# Patient Record
Sex: FEMALE | Race: WHITE | NOT HISPANIC OR LATINO | Employment: FULL TIME | URBAN - METROPOLITAN AREA
[De-identification: names, ages, dates, MRNs, and addresses within clinical notes are randomized per-mention and may not be internally consistent; named-entity substitution may affect disease eponyms.]

---
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
## 2018-04-25 ENCOUNTER — OFFICE VISIT (OUTPATIENT)
Dept: URGENT CARE | Facility: PHYSICIAN GROUP | Age: 18
End: 2018-04-25

## 2018-04-25 VITALS
WEIGHT: 207 LBS | HEIGHT: 62 IN | TEMPERATURE: 99.1 F | BODY MASS INDEX: 38.09 KG/M2 | DIASTOLIC BLOOD PRESSURE: 66 MMHG | HEART RATE: 110 BPM | OXYGEN SATURATION: 98 % | SYSTOLIC BLOOD PRESSURE: 112 MMHG

## 2018-04-25 DIAGNOSIS — S61.309A AVULSION OF FINGERNAIL, INITIAL ENCOUNTER: ICD-10-CM

## 2018-04-25 PROCEDURE — 90471 IMMUNIZATION ADMIN: CPT | Performed by: PHYSICIAN ASSISTANT

## 2018-04-25 PROCEDURE — 99214 OFFICE O/P EST MOD 30 MIN: CPT | Mod: 25 | Performed by: PHYSICIAN ASSISTANT

## 2018-04-25 PROCEDURE — 90715 TDAP VACCINE 7 YRS/> IM: CPT | Performed by: PHYSICIAN ASSISTANT

## 2018-04-25 RX ORDER — ONDANSETRON 4 MG/1
4 TABLET, ORALLY DISINTEGRATING ORAL ONCE
Status: COMPLETED | OUTPATIENT
Start: 2018-04-25 | End: 2018-04-25

## 2018-04-25 RX ADMIN — ONDANSETRON 4 MG: 4 TABLET, ORALLY DISINTEGRATING ORAL at 18:31

## 2018-04-25 ASSESSMENT — ENCOUNTER SYMPTOMS
SENSORY CHANGE: 0
FOCAL WEAKNESS: 0
NAUSEA: 1
TINGLING: 0
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
## 2018-04-25 NOTE — PATIENT INSTRUCTIONS
Nail Avulsion Injury  Nail avulsion means that you have lost the whole, or part of a nail. The nail will usually grow back in 2 to 6 months. If your injury damaged the growth center of the nail, the nail may be deformed, split, or not stuck to the nail bed. Sometimes the avulsed nail is stitched back in place. This provides temporary protection to the nail bed until the new nail grows in.   HOME CARE INSTRUCTIONS   · Raise (elevate) your injury as much as possible.  · Protect the injury and cover it with bandages (dressings) or splints as instructed.  · Change dressings as instructed.  SEEK MEDICAL CARE IF:   · There is increasing pain, redness, or swelling.  · You cannot move your fingers or toes.  This information is not intended to replace advice given to you by your health care provider. Make sure you discuss any questions you have with your health care provider.  Document Released: 01/25/2006 Document Revised: 03/11/2013 Document Reviewed: 05/04/2016  [employer] Interactive Patient Education © 2017 [employer]

## 2019-02-13 ENCOUNTER — HOSPITAL ENCOUNTER (OUTPATIENT)
Dept: LAB | Facility: MEDICAL CENTER | Age: 19
End: 2019-02-13
Attending: FAMILY MEDICINE
Payer: COMMERCIAL

## 2019-02-13 ENCOUNTER — OFFICE VISIT (OUTPATIENT)
Dept: MEDICAL GROUP | Facility: PHYSICIAN GROUP | Age: 19
End: 2019-02-13
Payer: COMMERCIAL

## 2019-02-13 ENCOUNTER — HOSPITAL ENCOUNTER (OUTPATIENT)
Facility: MEDICAL CENTER | Age: 19
End: 2019-02-13
Attending: FAMILY MEDICINE
Payer: COMMERCIAL

## 2019-02-13 VITALS
HEIGHT: 62 IN | OXYGEN SATURATION: 99 % | TEMPERATURE: 98.5 F | DIASTOLIC BLOOD PRESSURE: 66 MMHG | HEART RATE: 92 BPM | BODY MASS INDEX: 36.99 KG/M2 | WEIGHT: 201 LBS | SYSTOLIC BLOOD PRESSURE: 118 MMHG

## 2019-02-13 DIAGNOSIS — J02.9 PHARYNGITIS, UNSPECIFIED ETIOLOGY: ICD-10-CM

## 2019-02-13 LAB — QORDR QORDR: NORMAL

## 2019-02-13 PROCEDURE — 86665 EPSTEIN-BARR CAPSID VCA: CPT

## 2019-02-13 PROCEDURE — 99214 OFFICE O/P EST MOD 30 MIN: CPT | Performed by: FAMILY MEDICINE

## 2019-02-13 PROCEDURE — 86664 EPSTEIN-BARR NUCLEAR ANTIGEN: CPT

## 2019-02-13 PROCEDURE — 87070 CULTURE OTHR SPECIMN AEROBIC: CPT

## 2019-02-13 PROCEDURE — 86663 EPSTEIN-BARR ANTIBODY: CPT

## 2019-02-13 PROCEDURE — 36415 COLL VENOUS BLD VENIPUNCTURE: CPT

## 2019-02-13 PROCEDURE — 99000 SPECIMEN HANDLING OFFICE-LAB: CPT | Performed by: FAMILY MEDICINE

## 2019-02-13 ASSESSMENT — PATIENT HEALTH QUESTIONNAIRE - PHQ9: CLINICAL INTERPRETATION OF PHQ2 SCORE: 0

## 2019-02-13 NOTE — PROGRESS NOTES
CC: Pharyngitis    HISTORY OF THE PRESENT ILLNESS: Patient is a 18 y.o. female. This pleasant patient is here today to establish care and discuss the following health issues.    Pharyngitis: Patient notes that around November she was seen at an outside urgent care.  At that time she was diagnosed with viral pharyngitis.  She was apparently only tested for strep at that time and not flu or any other causes for pharyngitis.  Her associated symptoms included fevers, chills, cold symptoms and very significant fatigue.  She also notes a questionable rash on her hands.  Since that time the illness has somewhat improved although patient has remained with a sore throat and some sensation of swollen lymph nodes in her neck.  She also feels that her voice has never recovered and she continues to be hoarse.  She does also report that she has a couple of episodes of strep throat per year.  She like to be evaluated by ear nose and throat for possible tonsillectomy.    Allergies: Patient has no known allergies.    Current Outpatient Prescriptions Ordered in [facility]   Medication Sig Dispense Refill   • IBUPROFEN PO Take  by mouth.       No current Epic-ordered facility-administered medications on file.        Past Medical History:   Diagnosis Date   • Migraines        Past Surgical History:   Procedure Laterality Date   • EYE LACERATION REPAIR  1/25/2009    Performed by LARON MALCOLM at SURGERY [facility]       Social History   Substance Use Topics   • Smoking status: Never Smoker   • Smokeless tobacco: Never Used   • Alcohol use No       Social History     Social History Narrative   • No narrative on file       Family History   Problem Relation Age of Onset   • Heart Disease Maternal Grandfather        ROS:     - Constitutional: Negative for fever, chills, unexpected weight change, and fatigue/generalized weakness.     - HEENT see HPI.      - Respiratory: Negative for cough, sputum production, chest congestion, dyspnea,  "wheezing, and crackles.      - Cardiovascular: Negative for chest pain, palpitations, orthopnea, PND, and bilateral lower extremity edema.     - Gastrointestinal: Negative for heartburn, nausea, vomiting, abdominal pain, hematochezia, melena, diarrhea, constipation, and greasy/foul-smelling stools.     - Genitourinary: Negative for dysuria, polyuria, hematuria, pyuria, urinary urgency, and urinary incontinence.     - Skin: Negative for rash, itching, cyanotic skin color change.     Exam: Blood pressure 118/66, pulse 92, temperature 36.9 °C (98.5 °F), temperature source Temporal, height 1.575 m (5' 2\"), weight 91.2 kg (201 lb), SpO2 99 %. Body mass index is 36.76 kg/m².    General: Well appearing, NAD  HEENT: Normocephalic. Conjunctiva clear, lids without ptosis, pupils equal and reactive to light accommodation, ears normal shape and contour, canals are clear bilaterally, tympanic membranes without bulging or erythema and normal cone of light, nasal mucosa normal, oropharynx is without erythema, edema or exudates.   Pulmonary: Clear to ausculation.  Normal effort. No rales, ronchi, or wheezing.  Cardiovascular: Regular rate and rhythm without murmur, rubs or gallop.   Abdomen: Soft, nontender, nondistended. Normal bowel sounds. Liver and spleen are not palpable. No rebound or guarding  Lymph: No cervical, supraclavicular lymph nodes are palpable  Skin: Warm and dry.  No obvious lesions.  Psych: Normal mood and affect. Alert and oriented. Judgment and insight is normal.    Please note that this dictation was created using voice recognition software. I have made every reasonable attempt to correct obvious errors, but I expect that there are errors of grammar and possibly content that I did not discover before finalizing the note.      Assessment/Plan  Tami was seen today for pharyngitis.    Diagnoses and all orders for this visit:    Pharyngitis, unspecified etiology  New uncontrolled problem for the patient.  " Oropharynx actually normal in appearance today.  We will go ahead and do throat culture, blood test for EBV and referral to ENT.  -     CULTURE THROAT; Future  -     REFERRAL TO ENT  -     EBV CHRONIC/ACTIVE INFECTION; Future    Follow up as needed.     Laurence Lynch, DO  [facility]

## 2019-02-15 LAB
BACTERIA SPEC RESP CULT: NORMAL
EBV EA-D IGG SER-ACNC: <5 U/ML (ref 0–10.9)
EBV NA IGG SER IA-ACNC: 30.1 U/ML (ref 0–21.9)
EBV VCA IGG SER IA-ACNC: 535 U/ML (ref 0–21.9)
SIGNIFICANT IND 70042: NORMAL
SITE SITE: NORMAL
SOURCE SOURCE: NORMAL

## 2019-02-19 DIAGNOSIS — J02.9 PHARYNGITIS, UNSPECIFIED ETIOLOGY: ICD-10-CM

## 2019-05-09 ENCOUNTER — HOSPITAL ENCOUNTER (OUTPATIENT)
Dept: RADIOLOGY | Facility: MEDICAL CENTER | Age: 19
End: 2019-05-09
Attending: SPECIALIST
Payer: COMMERCIAL

## 2019-05-09 DIAGNOSIS — R13.19 ESOPHAGEAL DYSPHAGIA: ICD-10-CM

## 2019-05-09 PROCEDURE — 74220 X-RAY XM ESOPHAGUS 1CNTRST: CPT

## 2019-05-09 PROCEDURE — 700101 HCHG RX REV CODE 250: Performed by: SPECIALIST

## 2019-05-09 RX ADMIN — BARIUM SULFATE 700 MG: 700 TABLET ORAL at 13:15

## 2019-11-20 ENCOUNTER — OFFICE VISIT (OUTPATIENT)
Dept: MEDICAL GROUP | Facility: MEDICAL CENTER | Age: 19
End: 2019-11-20
Payer: COMMERCIAL

## 2019-11-20 VITALS
OXYGEN SATURATION: 96 % | RESPIRATION RATE: 16 BRPM | TEMPERATURE: 98.8 F | DIASTOLIC BLOOD PRESSURE: 72 MMHG | HEART RATE: 99 BPM | HEIGHT: 62 IN | WEIGHT: 208 LBS | BODY MASS INDEX: 38.28 KG/M2 | SYSTOLIC BLOOD PRESSURE: 118 MMHG

## 2019-11-20 DIAGNOSIS — E65 BUFFALO HUMP: ICD-10-CM

## 2019-11-20 DIAGNOSIS — G43.009 MIGRAINE WITHOUT AURA AND WITHOUT STATUS MIGRAINOSUS, NOT INTRACTABLE: ICD-10-CM

## 2019-11-20 DIAGNOSIS — Z76.89 ENCOUNTER TO ESTABLISH CARE: ICD-10-CM

## 2019-11-20 DIAGNOSIS — E66.9 OBESITY (BMI 30-39.9): ICD-10-CM

## 2019-11-20 DIAGNOSIS — R53.83 OTHER FATIGUE: ICD-10-CM

## 2019-11-20 DIAGNOSIS — R63.5 WEIGHT GAIN: ICD-10-CM

## 2019-11-20 DIAGNOSIS — M25.50 ARTHRALGIA, UNSPECIFIED JOINT: ICD-10-CM

## 2019-11-20 PROCEDURE — 99214 OFFICE O/P EST MOD 30 MIN: CPT | Performed by: NURSE PRACTITIONER

## 2019-11-20 NOTE — PROGRESS NOTES
CC: Establish care/weight gain/fatigue      Tami Jimenez is a 19 y.o. female here to establish care and to discuss the evaluation and management of:    Patient here today to establish care.  Here with her grandmother today.    Former PCP: none    Patient presents today to discuss : weight gain    1. Weight gain  Patient presents today to discuss her weight gain and inability to keep her weight off.  States that she was the age of 14 she really started to gain weight.  Apparently gained about 40 pounds in 1 month.  She also started to develop a bump on the back of her neck.  Does not drink sodas-causes her indigestion, avoids dairy because it makes her break out. Drinks mostly water and tea through the day. No exercise. Has tried to loose weight and went to the gym did not make a difference.     2. Other fatigue  Patient did have a positive Raphael-Barr virus back in February.  She complains of continued fatigue. Feels like her tonsils are swollen. They are achy.  No stuffy nose. This has been since that she was sick back last year. No fevers    3. Arthralgia, unspecified joint  Right knee and both wrist-joint pain for sometime. No dry mouth, no dry eye. She reports she did injure her wrists as a child- no bone breaks.    4. [place] hump  States she has had this on the back of her neck since the age of 14.  No pain.    5. Migraine without aura and without status migrainosus, not intractable  Gets them every other day. Started at age 10, takes Excedrin which can be helpful.  Does take with food but makes her stomach hurt.  Sometimes around her period. Some stress can exacerbate these.  Starts at the back of her neck and goes over her head, left side of her head and around her eyes.        ROS:  Denies any Headache, Blurred Vision, Confusion, Chest pain,  Shortness of breath,  Abdominal pain, Changes of bowel or bladder, Hematuria, Hematochezia, Lower ext. edema, Fevers, Nights sweats, Weight Changes, Focal  weakness or numbness.  And all other systems are negative.      Current Outpatient Medications:   •  IBUPROFEN PO, Take  by mouth., Disp: , Rfl:     No Known Allergies    Past Medical History:   Diagnosis Date   • Migraines     no aura     Past Surgical History:   Procedure Laterality Date   • EYE LACERATION REPAIR  1/25/2009    Performed by LARON MALCOLM at SURGERY [facility]S     Family History   Problem Relation Age of Onset   • Heart Disease Maternal Grandfather    • Hypertension Maternal Grandfather    • No Known Problems Mother    • Heart Disease Paternal Grandmother         CABG   • Heart Attack Paternal Grandfather    • Alcohol abuse Paternal Grandfather    • Diabetes Other      Social History     Socioeconomic History   • Marital status: Single     Spouse name: Not on file   • Number of children: Not on file   • Years of education: Not on file   • Highest education level: Not on file   Occupational History   • Not on file   Social Needs   • Financial resource strain: Not on file   • Food insecurity:     Worry: Not on file     Inability: Not on file   • Transportation needs:     Medical: Not on file     Non-medical: Not on file   Tobacco Use   • Smoking status: Never Smoker   • Smokeless tobacco: Never Used   Substance and Sexual Activity   • Alcohol use: Yes     Comment: occ   • Drug use: No   • Sexual activity: Not Currently   Lifestyle   • Physical activity:     Days per week: Not on file     Minutes per session: Not on file   • Stress: Not on file   Relationships   • Social connections:     Talks on phone: Not on file     Gets together: Not on file     Attends [employer] service: Not on file     Active member of club or organization: Not on file     Attends meetings of clubs or organizations: Not on file     Relationship status: Not on file   • Intimate partner violence:     Fear of current or ex partner: Not on file     Emotionally abused: Not on file     Physically abused: Not on file     Forced  "sexual activity: Not on file   Other Topics Concern   • Not on file   Social History Narrative   • Not on file       Objective:     Vitals: /72   Pulse 99   Temp 37.1 °C (98.8 °F)   Resp 16   Ht 1.575 m (5' 2\")   Wt 94.3 kg (208 lb)   SpO2 96%   BMI 38.04 kg/m²      General: Alert, pleasant, NAD  HEENT:  Normocephalic. Neck supple.  No thyromegaly or masses palpated. No cervical or supraclavicular lymphadenopathy.  Heart:  Regular rate and rhythm.  S1 and S2 normal.  No murmurs appreciated.    Respiratory:  Normal respiratory effort.  Clear to auscultation bilaterally.    Skin:  Warm, dry, no rashes  Musculoskeletal:  Gait is normal.  Moves all extremities well. Fat pad at base of neck  Extremities: No leg edema.  Neurological: No tremors,   Psych:  Affect/mood is normal, judgement is good, memory is intact, grooming is appropriate.      Assessment and Plan.     19 y.o. female to establish care and discuss the followin. Weight gain  - CBC WITHOUT DIFFERENTIAL; Future  - Comp Metabolic Panel; Future  - HEMOGLOBIN A1C; Future  - TSH WITH REFLEX TO FT4; Future  - TRIIDOTHYRONINE; Future  - CORTISOL; Future  - PROLACTIN; Future  - ACTH; Future  - PTH INTACT (PTH ONLY); Future    2. Other fatigue  ?  Raphael-Barr virus positive in 2019.  - ACTH; Future  - PTH INTACT (PTH ONLY); Future    3. Arthralgia, unspecified joint  - CHATA REFLEXIVE PROFILE; Future  - RHEUMATOID ARTHRITIS FACTOR; Future    4. [place] hump  - ACTH; Future  - PTH INTACT (PTH ONLY); Future    5. Migraine without aura and without status migrainosus, not intractable  Chronic.  Encouraged patient to utilize Tylenol or ibuprofen for headache. Try to keep a headache diary of triggers such as excessive caffeine, lack of sleep, neck and shoulder muscle tension, increased stress, prolonged computer time or poor lighting.    6. Obesity (BMI 30-39.9)  Chronic. Patient encouraged to reduce excess calorie consumption. Encouraged " regular exercise. Discussed long term sequelae of obesity.  - Patient identified as having weight management issue.  Appropriate orders and counseling given.    7. Encounter to establish care        No follow-ups on file.        Laurence PORRAS.

## 2019-11-21 ENCOUNTER — HOSPITAL ENCOUNTER (OUTPATIENT)
Dept: LAB | Facility: MEDICAL CENTER | Age: 19
End: 2019-11-21
Attending: FAMILY MEDICINE
Payer: COMMERCIAL

## 2019-11-21 ENCOUNTER — HOSPITAL ENCOUNTER (OUTPATIENT)
Dept: LAB | Facility: MEDICAL CENTER | Age: 19
End: 2019-11-21
Attending: NURSE PRACTITIONER
Payer: COMMERCIAL

## 2019-11-21 DIAGNOSIS — J02.9 PHARYNGITIS, UNSPECIFIED ETIOLOGY: ICD-10-CM

## 2019-11-21 DIAGNOSIS — R63.5 WEIGHT GAIN: ICD-10-CM

## 2019-11-21 DIAGNOSIS — M25.50 ARTHRALGIA, UNSPECIFIED JOINT: ICD-10-CM

## 2019-11-21 PROBLEM — G44.209 TENSION HEADACHE: Status: ACTIVE | Noted: 2019-11-21

## 2019-11-21 PROBLEM — G43.009 MIGRAINE WITHOUT AURA AND WITHOUT STATUS MIGRAINOSUS, NOT INTRACTABLE: Status: ACTIVE | Noted: 2019-11-21

## 2019-11-21 PROBLEM — E66.9 OBESITY (BMI 30-39.9): Status: ACTIVE | Noted: 2019-11-21

## 2019-11-21 PROBLEM — E66.9 OBESITY (BMI 30-39.9): Status: RESOLVED | Noted: 2019-11-21 | Resolved: 2019-11-21

## 2019-11-21 LAB
ALBUMIN SERPL BCP-MCNC: 4.2 G/DL (ref 3.2–4.9)
ALBUMIN/GLOB SERPL: 1.3 G/DL
ALP SERPL-CCNC: 95 U/L (ref 30–99)
ALT SERPL-CCNC: 17 U/L (ref 2–50)
ANION GAP SERPL CALC-SCNC: 8 MMOL/L (ref 0–11.9)
AST SERPL-CCNC: 17 U/L (ref 12–45)
BILIRUB SERPL-MCNC: 0.4 MG/DL (ref 0.1–1.5)
BUN SERPL-MCNC: 10 MG/DL (ref 8–22)
CALCIUM SERPL-MCNC: 9 MG/DL (ref 8.5–10.5)
CHLORIDE SERPL-SCNC: 103 MMOL/L (ref 96–112)
CO2 SERPL-SCNC: 25 MMOL/L (ref 20–33)
CORTIS SERPL-MCNC: 11 UG/DL (ref 0–23)
CREAT SERPL-MCNC: 0.75 MG/DL (ref 0.5–1.4)
ERYTHROCYTE [DISTWIDTH] IN BLOOD BY AUTOMATED COUNT: 47.4 FL (ref 35.9–50)
EST. AVERAGE GLUCOSE BLD GHB EST-MCNC: 105 MG/DL
GLOBULIN SER CALC-MCNC: 3.3 G/DL (ref 1.9–3.5)
GLUCOSE SERPL-MCNC: 87 MG/DL (ref 65–99)
HBA1C MFR BLD: 5.3 % (ref 0–5.6)
HCT VFR BLD AUTO: 45 % (ref 37–47)
HGB BLD-MCNC: 14.6 G/DL (ref 12–16)
MCH RBC QN AUTO: 30.7 PG (ref 27–33)
MCHC RBC AUTO-ENTMCNC: 32.4 G/DL (ref 33.6–35)
MCV RBC AUTO: 94.5 FL (ref 81.4–97.8)
PLATELET # BLD AUTO: 225 K/UL (ref 164–446)
PMV BLD AUTO: 11.1 FL (ref 9–12.9)
POTASSIUM SERPL-SCNC: 3.9 MMOL/L (ref 3.6–5.5)
PROLACTIN SERPL-MCNC: 10.46 NG/ML (ref 2.8–26)
PROT SERPL-MCNC: 7.5 G/DL (ref 6–8.2)
RBC # BLD AUTO: 4.76 M/UL (ref 4.2–5.4)
RHEUMATOID FACT SER IA-ACNC: <10 IU/ML (ref 0–14)
SODIUM SERPL-SCNC: 136 MMOL/L (ref 135–145)
T3 SERPL-MCNC: 125.4 NG/DL (ref 60–181)
TSH SERPL DL<=0.005 MIU/L-ACNC: 1.34 UIU/ML (ref 0.38–5.33)
WBC # BLD AUTO: 7.4 K/UL (ref 4.8–10.8)

## 2019-11-21 PROCEDURE — 85027 COMPLETE CBC AUTOMATED: CPT

## 2019-11-21 PROCEDURE — 86038 ANTINUCLEAR ANTIBODIES: CPT

## 2019-11-21 PROCEDURE — 84443 ASSAY THYROID STIM HORMONE: CPT

## 2019-11-21 PROCEDURE — 86431 RHEUMATOID FACTOR QUANT: CPT

## 2019-11-21 PROCEDURE — 82533 TOTAL CORTISOL: CPT

## 2019-11-21 PROCEDURE — 36415 COLL VENOUS BLD VENIPUNCTURE: CPT

## 2019-11-21 PROCEDURE — 83036 HEMOGLOBIN GLYCOSYLATED A1C: CPT

## 2019-11-21 PROCEDURE — 80053 COMPREHEN METABOLIC PANEL: CPT

## 2019-11-21 PROCEDURE — 84480 ASSAY TRIIODOTHYRONINE (T3): CPT

## 2019-11-21 PROCEDURE — 84146 ASSAY OF PROLACTIN: CPT

## 2019-11-21 PROCEDURE — 86665 EPSTEIN-BARR CAPSID VCA: CPT

## 2019-11-22 LAB
EBV VCA IGM SER IA-ACNC: <10 U/ML (ref 0–43.9)
NUCLEAR IGG SER QL IA: NORMAL

## 2019-12-02 ENCOUNTER — OFFICE VISIT (OUTPATIENT)
Dept: MEDICAL GROUP | Facility: MEDICAL CENTER | Age: 19
End: 2019-12-02
Payer: COMMERCIAL

## 2019-12-02 VITALS
WEIGHT: 210 LBS | DIASTOLIC BLOOD PRESSURE: 60 MMHG | SYSTOLIC BLOOD PRESSURE: 108 MMHG | RESPIRATION RATE: 16 BRPM | OXYGEN SATURATION: 98 % | HEART RATE: 99 BPM | BODY MASS INDEX: 38.64 KG/M2 | HEIGHT: 62 IN | TEMPERATURE: 98.2 F

## 2019-12-02 DIAGNOSIS — Z71.3 ENCOUNTER FOR WEIGHT LOSS COUNSELING: ICD-10-CM

## 2019-12-02 DIAGNOSIS — E66.9 OBESITY (BMI 35.0-39.9 WITHOUT COMORBIDITY): ICD-10-CM

## 2019-12-02 DIAGNOSIS — R63.5 WEIGHT GAIN: ICD-10-CM

## 2019-12-02 PROCEDURE — 99213 OFFICE O/P EST LOW 20 MIN: CPT | Performed by: NURSE PRACTITIONER

## 2019-12-02 NOTE — PROGRESS NOTES
cc: Follow-up labs.      Subjective:     HPI:     Tami Jimenez is a 19 y.o. female here to discuss the evaluation and management of:    Patient is here to follow-up her labs.  She initially had presented due to concern about having an increase in her weight gain.  Was having concerns regarding her thyroid.  Have reviewed her labs.  Thyroid is normal, negative CHATA, normal CBC, normal CMP.  A1c is at 5.3%.  Have reviewed this with the patient.  Have also discussed working on dietary management and physical exercise.  Patient does admit to having poor diet choices as she does state that she did have a half a pound of fat yesterday.  States it is difficult during the holidays.  Had given patient plenty handouts at her last visit regarding opportunities for her to make small changes in her diet to help reduce her weight.      ROS:  Denies any Headache, Blurred Vision, Confusion, Chest pain,  Shortness of breath,  Abdominal pain, Changes of bowel or bladder, Lower ext edema, Fevers, Nights sweats, Weight Changes, Focal weakness or numbness.  And all other systems are negative        Current Outpatient Medications:   •  IBUPROFEN PO, Take  by mouth., Disp: , Rfl:     No Known Allergies    Past Medical History:   Diagnosis Date   • Migraines     no aura     Past Surgical History:   Procedure Laterality Date   • EYE LACERATION REPAIR  1/25/2009    Performed by LARON MALCOLM at SURGERY [facility]     Family History   Problem Relation Age of Onset   • Heart Disease Maternal Grandfather    • Hypertension Maternal Grandfather    • No Known Problems Mother    • Heart Disease Paternal Grandmother         CABG   • Heart Attack Paternal Grandfather    • Alcohol abuse Paternal Grandfather    • Diabetes Other      Social History     Socioeconomic History   • Marital status: Single     Spouse name: Not on file   • Number of children: Not on file   • Years of education: Not on file   • Highest education level: Not on  "file   Occupational History   • Not on file   Social Needs   • Financial resource strain: Not on file   • Food insecurity:     Worry: Not on file     Inability: Not on file   • Transportation needs:     Medical: Not on file     Non-medical: Not on file   Tobacco Use   • Smoking status: Never Smoker   • Smokeless tobacco: Never Used   Substance and Sexual Activity   • Alcohol use: Yes     Comment: occ   • Drug use: No   • Sexual activity: Not Currently   Lifestyle   • Physical activity:     Days per week: Not on file     Minutes per session: Not on file   • Stress: Not on file   Relationships   • Social connections:     Talks on phone: Not on file     Gets together: Not on file     Attends Christian service: Not on file     Active member of club or organization: Not on file     Attends meetings of clubs or organizations: Not on file     Relationship status: Not on file   • Intimate partner violence:     Fear of current or ex partner: Not on file     Emotionally abused: Not on file     Physically abused: Not on file     Forced sexual activity: Not on file   Other Topics Concern   • Not on file   Social History Narrative   • Not on file       Objective:     Vitals: /60   Pulse 99   Temp 36.8 °C (98.2 °F)   Resp 16   Ht 1.575 m (5' 2\")   Wt 95.3 kg (210 lb)   SpO2 98%   BMI 38.41 kg/m²    General: Alert, pleasant, NAD  HEENT: Normocephalic.    Skin: Warm, dry, no rashes.  Extremities: No leg edema. No discoloration  Neurological: No tremors  Psych:  Affect/mood is normal, judgement is good, memory is intact, grooming is appropriate.    Assessment/Plan:     Diagnoses and all orders for this visit:    Weight gain  Obesity (BMI 35.0-39.9 without comorbidity) (HCC)  Chronic. Patient encouraged to reduce excess calorie consumption. Encouraged regular exercise. Discussed long term sequelae of obesity.  Has admitted to some poor dietary choices.  Have discussed with patient to work on incorporating dietary " changes, heart healthy diet, increasing her physical activity.  Have also discussed referral over to [facility] for additional support.  Again her thyroid was in normal range.  -     REFERRAL TO [facility] (HIP) Services Requested: [facility] Medical Weight Management Program; Reason for Referral? BMI>30; Reason for Visit: Overweight/Obesity    Encounter for weight loss counseling  -     REFERRAL TO [facility] (HIP) Services Requested: [facility]ician Medical Weight Management Program; Reason for Referral? BMI>30; Reason for Visit: Overweight/Obesity          Return if symptoms worsen or fail to improve.          Laurence PORRAS.

## 2021-03-29 ENCOUNTER — TELEMEDICINE (OUTPATIENT)
Dept: MEDICAL GROUP | Facility: MEDICAL CENTER | Age: 21
End: 2021-03-29
Payer: COMMERCIAL

## 2021-03-29 VITALS — HEIGHT: 62 IN | BODY MASS INDEX: 40.48 KG/M2 | WEIGHT: 220 LBS

## 2021-03-29 DIAGNOSIS — L60.0 INGROWN TOENAIL: ICD-10-CM

## 2021-03-29 DIAGNOSIS — L03.031 PARONYCHIA OF TOE, RIGHT: ICD-10-CM

## 2021-03-29 PROCEDURE — 99213 OFFICE O/P EST LOW 20 MIN: CPT | Mod: 95,CR | Performed by: NURSE PRACTITIONER

## 2021-03-29 ASSESSMENT — FIBROSIS 4 INDEX: FIB4 SCORE: 0.4

## 2021-03-29 NOTE — PROGRESS NOTES
"Telemedicine Video Visit: Established Patient   This Remote Face to Face encounter was conducted via Zoom. Given the importance of social distancing and other strategies recommended to reduce the risk of COVID-19 transmission, I am providing medical care to this patient via audio/video visit in place of an in person visit at the request of the patient. Verbal consent to telehealth, risks, benefits, and consequences were discussed. Patient retains the right to withdraw at any time. All existing confidentiality protections apply. The patient has access to all transmitted medical information. No dissemination of any patient images or information to other entities without further written consent.  Subjective:     Chief Complaint   Patient presents with   • Toe Injury     Last summer, R foot Big toe       Tami Jimenez is a 20 y.o. female presenting for evaluation and management of:      Last summer injured her foot. Hit her right foot with heavy bag. Then continued to have pain. Her toenail did end up falling off. Felt like an \"ingrown\" toenail for quite some time. Very sensitive and painful. End of January had more swelling and was able to express some purulent material. Soaked in Epson Salt.   About one week ago her brother stepped on this toe and aggravated her pain.   At this time she is having redness and tenderness on the medial aspect of her right great toe.  No fevers.      ROS see above  Denies any recent fevers or chills. No nausea or vomiting. No chest pains or shortness of breath.     No Known Allergies    Current medicines (including changes today)  Current Outpatient Medications   Medication Sig Dispense Refill   • mupirocin (BACTROBAN) 2 % Ointment Apply 1 Application topically 2 times a day. 22 g 0     No current facility-administered medications for this visit.       Patient Active Problem List    Diagnosis Date Noted   • Migraine without aura and without status migrainosus, not intractable " "11/21/2019   • Tension headache 11/21/2019   • Obesity (BMI 35.0-39.9 without comorbidity) ([facility]) 11/18/2017       Family History   Problem Relation Age of Onset   • Heart Disease Maternal Grandfather    • Hypertension Maternal Grandfather    • No Known Problems Mother    • Heart Disease Paternal Grandmother         CABG   • Heart Attack Paternal Grandfather    • Alcohol abuse Paternal Grandfather    • Diabetes Other        She  has a past medical history of Migraines.  She  has a past surgical history that includes eye laceration repair (1/25/2009).       Objective:   Vitals obtained by patient:  Ht 1.575 m (5' 2\")   Wt 99.8 kg (220 lb)   LMP 03/15/2021 (Exact Date)   Breastfeeding No   BMI 40.24 kg/m²     Physical Exam: Positive erythema and scant edema on right great toe  Constitutional: Alert, no distress, well-groomed.  Skin: No rashes in visible areas.  Eye: Round. Conjunctiva clear, lids normal. No icterus.   ENMT: Lips pink without lesions, good dentition, moist mucous membranes. Phonation normal.  Neck: No masses, no thyromegaly. Moves freely without pain.  CV: Pulse as reported by patient  Respiratory: Unlabored respiratory effort, no cough or audible wheeze  Psych: Alert and oriented x3, normal affect and mood.       Assessment and Plan:   The following treatment plan was discussed:     1. Ingrown toenail  2. Paronychia of toe, right  Appears stable through video visit.  Slight erythema on medial aspect of right great toenail.  No abscess visualized.  Recommend continued soaking in Epson salts or warm water, avoid tight fitting shoes.  Apply mupirocin around to nail bed.  Hold on oral antibiotics at this time unless symptoms worsen.  Have also discussed potential follow-up with podiatry for further recommendation if her symptoms do not improve.  She will message via [employer].    - mupirocin (BACTROBAN) 2 % Ointment; Apply 1 Application topically 2 times a day.  Dispense: 22 g; Refill: " 0        Follow-up: Return if symptoms worsen or fail to improve.    Face to Face Video Visit:     RODRIGO Morrow

## 2021-12-18 ENCOUNTER — OFFICE VISIT (OUTPATIENT)
Dept: URGENT CARE | Facility: PHYSICIAN GROUP | Age: 21
End: 2021-12-18
Payer: COMMERCIAL

## 2021-12-18 ENCOUNTER — HOSPITAL ENCOUNTER (OUTPATIENT)
Facility: MEDICAL CENTER | Age: 21
End: 2021-12-18
Attending: PHYSICIAN ASSISTANT
Payer: COMMERCIAL

## 2021-12-18 VITALS
SYSTOLIC BLOOD PRESSURE: 120 MMHG | HEIGHT: 62 IN | WEIGHT: 220.6 LBS | RESPIRATION RATE: 16 BRPM | DIASTOLIC BLOOD PRESSURE: 80 MMHG | TEMPERATURE: 98.2 F | HEART RATE: 103 BPM | OXYGEN SATURATION: 98 % | BODY MASS INDEX: 40.59 KG/M2

## 2021-12-18 DIAGNOSIS — J02.9 SORE THROAT: ICD-10-CM

## 2021-12-18 LAB
INT CON NEG: NEGATIVE
INT CON POS: POSITIVE
S PYO AG THROAT QL: NEGATIVE

## 2021-12-18 PROCEDURE — 99213 OFFICE O/P EST LOW 20 MIN: CPT | Performed by: PHYSICIAN ASSISTANT

## 2021-12-18 PROCEDURE — 87880 STREP A ASSAY W/OPTIC: CPT | Performed by: PHYSICIAN ASSISTANT

## 2021-12-18 PROCEDURE — 87070 CULTURE OTHR SPECIMN AEROBIC: CPT

## 2021-12-18 ASSESSMENT — ENCOUNTER SYMPTOMS
COUGH: 0
TROUBLE SWALLOWING: 0
DIARRHEA: 0
VOMITING: 0
SORE THROAT: 1
EYE REDNESS: 0
EYE DISCHARGE: 0
SHORTNESS OF BREATH: 0
FEVER: 0
MYALGIAS: 0
NAUSEA: 0
HEADACHES: 1

## 2021-12-18 NOTE — PROGRESS NOTES
Subjective     Tami Jimenez is a 21 y.o. female who presents with Pharyngitis (Tonsils are swollen, white bumps in the back, x1 day )        Pharyngitis   This is a new problem. Episode onset: x last night. The problem has been unchanged. The pain is worse on the right side. There has been no fever. The pain is mild. Associated symptoms include congestion and headaches. Pertinent negatives include no coughing, diarrhea, drooling, ear pain, shortness of breath, trouble swallowing or vomiting. She has had no exposure to strep. She has tried nothing for the symptoms.     The patient reports no known exposure to COVID-19.  The patient states she was recently diagnosed with COVID-19 x1 month ago.  The patient is fully vaccinated against COVID-19.    PMH:  has a past medical history of Migraines.  MEDS:   Current Outpatient Medications:   •  mupirocin (BACTROBAN) 2 % Ointment, Apply 1 Application topically 2 times a day., Disp: 22 g, Rfl: 0  ALLERGIES: No Known Allergies  SURGHX:   Past Surgical History:   Procedure Laterality Date   • EYE LACERATION REPAIR  1/25/2009    Performed by LARON MALCOLM at SURGERY [facility]     SOCHX:  reports that she has never smoked. She has never used smokeless tobacco. She reports current alcohol use. She reports that she does not use drugs.  FH: Family history was reviewed, no pertinent findings to report    Review of Systems   Constitutional: Negative for fever.   HENT: Positive for congestion and sore throat. Negative for drooling, ear pain and trouble swallowing.    Eyes: Negative for discharge and redness.   Respiratory: Negative for cough and shortness of breath.    Cardiovascular: Negative for chest pain.   Gastrointestinal: Negative for diarrhea, nausea and vomiting.   Musculoskeletal: Negative for myalgias.   Neurological: Positive for headaches.              Objective     /80 (BP Location: Left arm, Patient Position: Sitting, BP Cuff Size: Adult long)    "Pulse (!) 103   Temp 36.8 °C (98.2 °F) (Temporal)   Resp 16   Ht 1.575 m (5' 2\")   Wt 100 kg (220 lb 9.6 oz)   SpO2 98%   BMI 40.35 kg/m²      Physical Exam  Constitutional:       General: She is not in acute distress.     Appearance: Normal appearance. She is not ill-appearing.   HENT:      Head: Normocephalic and atraumatic.      Right Ear: Tympanic membrane, ear canal and external ear normal.      Left Ear: Tympanic membrane, ear canal and external ear normal.      Nose: Nose normal.      Mouth/Throat:      Mouth: Mucous membranes are moist.      Pharynx: Oropharynx is clear. Uvula midline. Posterior oropharyngeal erythema present.      Tonsils: No tonsillar exudate.   Eyes:      Extraocular Movements: Extraocular movements intact.      Conjunctiva/sclera: Conjunctivae normal.   Cardiovascular:      Rate and Rhythm: Normal rate and regular rhythm.      Heart sounds: Normal heart sounds.   Pulmonary:      Effort: Pulmonary effort is normal. No respiratory distress.      Breath sounds: Normal breath sounds. No wheezing.   Musculoskeletal:         General: Normal range of motion.      Cervical back: Normal range of motion and neck supple.   Skin:     General: Skin is warm and dry.   Neurological:      Mental Status: She is alert and oriented to person, place, and time.               Progress:  POCT Rapid Strep: NEGATIVE     Throat Culture - pending               Assessment & Plan          1. Sore throat  - POCT Rapid Strep A  - CULTURE THROAT; Future    The patient's presenting symptoms and physical exam endings are consistent with a sore throat.  On physical exam, the patient had erythema to the posterior oropharynx without tonsil hypertrophy or exudates.  The remainder the patient's physical exam today in clinic was normal.  The patient appears in no acute distress.  The patient's vital signs are stable and within normal limits.  She is afebrile today in clinic.  The patient's POCT rapid strep test today in " clinic was negative.  We will culture the patient's throat to rule other possible bacterial sources.  Advised the patient that her sore throat may be related to a viral illness.  Recommend OTC medications and supportive care for symptomatic management.  Recommend patient follow-up with PCP as needed.  Discussed return precautions with the patient, and she verbalized understanding.    Differential diagnoses, supportive care, and indications for immediate follow-up discussed with patient.   Instructed to return to clinic or nearest emergency department for any change in condition, further concerns, or worsening of symptoms.    OTC Tylenol or Motrin for fever/discomfort.  OTC Supportive Care for Sore Throat - warm salt water gargles, sore throat lozenges, warm lemon water, and/or tea.  Drink plenty of fluids  Follow-up with PCP   Return to clinic or go to the ED if symptoms worsen or fail to improve, or if patient should develop worsening/increasing sore throat, difficulty swallowing, drooling, change in voice, swollen glands, shortness of breath, ear pain, cough, congestion, fever/chills, and/or any concerning symptoms.    Discussed plan with the patient, and she agrees to the above.     I personally reviewed prior external notes and test results pertinent to today's visit.  I have independently reviewed and interpreted all diagnostics ordered during this urgent care visit.     Time spent evaluating this patient was at least 30 minutes and includes preparing for visit, obtaining history, exam and evaluation, ordering labs/tests/procedures/medications, independent interpretation, and counseling/education.    Please note that this dictation was created using voice recognition software. I have made every reasonable attempt to correct obvious errors, but I expect that there may be errors of grammar and possibly content that I did not discover before finalizing the note.     This note was electronically signed by Kelli  LATANYA Matta

## 2021-12-21 LAB
BACTERIA SPEC RESP CULT: NORMAL
SIGNIFICANT IND 70042: NORMAL
SITE SITE: NORMAL
SOURCE SOURCE: NORMAL

## 2022-04-27 ENCOUNTER — NON-PROVIDER VISIT (OUTPATIENT)
Dept: URGENT CARE | Facility: PHYSICIAN GROUP | Age: 22
End: 2022-04-27

## 2022-04-27 DIAGNOSIS — Z02.1 PRE-EMPLOYMENT DRUG SCREENING: Primary | ICD-10-CM

## 2022-04-27 LAB
AMP AMPHETAMINE: NORMAL
COC COCAINE: NORMAL
INT CON NEG: NORMAL
INT CON POS: NORMAL
MET METHAMPHETAMINES: NORMAL
OPI OPIATES: NORMAL
PCP PHENCYCLIDINE: NORMAL
POC DRUG COMMENT 753798-OCCUPATIONAL HEALTH: NEGATIVE
THC: NORMAL

## 2022-04-27 PROCEDURE — 80305 DRUG TEST PRSMV DIR OPT OBS: CPT | Performed by: PHYSICIAN ASSISTANT

## 2022-09-04 ENCOUNTER — APPOINTMENT (OUTPATIENT)
Dept: RADIOLOGY | Facility: MEDICAL CENTER | Age: 22
End: 2022-09-04
Attending: EMERGENCY MEDICINE
Payer: OTHER MISCELLANEOUS

## 2022-09-04 ENCOUNTER — HOSPITAL ENCOUNTER (EMERGENCY)
Facility: MEDICAL CENTER | Age: 22
End: 2022-09-04
Attending: EMERGENCY MEDICINE
Payer: OTHER MISCELLANEOUS

## 2022-09-04 VITALS
RESPIRATION RATE: 16 BRPM | SYSTOLIC BLOOD PRESSURE: 116 MMHG | HEIGHT: 62 IN | BODY MASS INDEX: 37.77 KG/M2 | WEIGHT: 205.25 LBS | OXYGEN SATURATION: 99 % | DIASTOLIC BLOOD PRESSURE: 75 MMHG | TEMPERATURE: 97 F | HEART RATE: 90 BPM

## 2022-09-04 DIAGNOSIS — R11.2 NAUSEA AND VOMITING, INTRACTABILITY OF VOMITING NOT SPECIFIED, UNSPECIFIED VOMITING TYPE: ICD-10-CM

## 2022-09-04 DIAGNOSIS — R10.33 PERIUMBILICAL ABDOMINAL PAIN: ICD-10-CM

## 2022-09-04 LAB
ALBUMIN SERPL BCP-MCNC: 4.4 G/DL (ref 3.2–4.9)
ALBUMIN/GLOB SERPL: 1.4 G/DL
ALP SERPL-CCNC: 95 U/L (ref 30–99)
ALT SERPL-CCNC: 15 U/L (ref 2–50)
ANION GAP SERPL CALC-SCNC: 11 MMOL/L (ref 7–16)
APPEARANCE UR: ABNORMAL
AST SERPL-CCNC: 19 U/L (ref 12–45)
BACTERIA #/AREA URNS HPF: NEGATIVE /HPF
BASOPHILS # BLD AUTO: 0.7 % (ref 0–1.8)
BASOPHILS # BLD: 0.06 K/UL (ref 0–0.12)
BILIRUB SERPL-MCNC: 0.2 MG/DL (ref 0.1–1.5)
BILIRUB UR QL STRIP.AUTO: NEGATIVE
BUN SERPL-MCNC: 9 MG/DL (ref 8–22)
CALCIUM SERPL-MCNC: 9.3 MG/DL (ref 8.5–10.5)
CHLORIDE SERPL-SCNC: 102 MMOL/L (ref 96–112)
CO2 SERPL-SCNC: 24 MMOL/L (ref 20–33)
COLOR UR: YELLOW
CREAT SERPL-MCNC: 0.79 MG/DL (ref 0.5–1.4)
EOSINOPHIL # BLD AUTO: 0.25 K/UL (ref 0–0.51)
EOSINOPHIL NFR BLD: 2.8 % (ref 0–6.9)
EPI CELLS #/AREA URNS HPF: ABNORMAL /HPF
ERYTHROCYTE [DISTWIDTH] IN BLOOD BY AUTOMATED COUNT: 44.4 FL (ref 35.9–50)
GFR SERPLBLD CREATININE-BSD FMLA CKD-EPI: 107 ML/MIN/1.73 M 2
GLOBULIN SER CALC-MCNC: 3.2 G/DL (ref 1.9–3.5)
GLUCOSE SERPL-MCNC: 87 MG/DL (ref 65–99)
GLUCOSE UR STRIP.AUTO-MCNC: NEGATIVE MG/DL
HCG SERPL QL: NEGATIVE
HCT VFR BLD AUTO: 43.2 % (ref 37–47)
HGB BLD-MCNC: 14.9 G/DL (ref 12–16)
HYALINE CASTS #/AREA URNS LPF: ABNORMAL /LPF
IMM GRANULOCYTES # BLD AUTO: 0.05 K/UL (ref 0–0.11)
IMM GRANULOCYTES NFR BLD AUTO: 0.6 % (ref 0–0.9)
KETONES UR STRIP.AUTO-MCNC: NEGATIVE MG/DL
LEUKOCYTE ESTERASE UR QL STRIP.AUTO: NEGATIVE
LIPASE SERPL-CCNC: 44 U/L (ref 11–82)
LYMPHOCYTES # BLD AUTO: 2.7 K/UL (ref 1–4.8)
LYMPHOCYTES NFR BLD: 30.5 % (ref 22–41)
MCH RBC QN AUTO: 31.4 PG (ref 27–33)
MCHC RBC AUTO-ENTMCNC: 34.5 G/DL (ref 33.6–35)
MCV RBC AUTO: 90.9 FL (ref 81.4–97.8)
MICRO URNS: ABNORMAL
MONOCYTES # BLD AUTO: 0.42 K/UL (ref 0–0.85)
MONOCYTES NFR BLD AUTO: 4.8 % (ref 0–13.4)
NEUTROPHILS # BLD AUTO: 5.36 K/UL (ref 2–7.15)
NEUTROPHILS NFR BLD: 60.6 % (ref 44–72)
NITRITE UR QL STRIP.AUTO: NEGATIVE
NRBC # BLD AUTO: 0 K/UL
NRBC BLD-RTO: 0 /100 WBC
PH UR STRIP.AUTO: 8 [PH] (ref 5–8)
PLATELET # BLD AUTO: 255 K/UL (ref 164–446)
PMV BLD AUTO: 10.4 FL (ref 9–12.9)
POTASSIUM SERPL-SCNC: 3.7 MMOL/L (ref 3.6–5.5)
PROT SERPL-MCNC: 7.6 G/DL (ref 6–8.2)
PROT UR QL STRIP: NEGATIVE MG/DL
RBC # BLD AUTO: 4.75 M/UL (ref 4.2–5.4)
RBC # URNS HPF: ABNORMAL /HPF
RBC UR QL AUTO: NEGATIVE
SODIUM SERPL-SCNC: 137 MMOL/L (ref 135–145)
SP GR UR STRIP.AUTO: 1.01
UROBILINOGEN UR STRIP.AUTO-MCNC: 0.2 MG/DL
WBC # BLD AUTO: 8.8 K/UL (ref 4.8–10.8)
WBC #/AREA URNS HPF: ABNORMAL /HPF

## 2022-09-04 PROCEDURE — 700102 HCHG RX REV CODE 250 W/ 637 OVERRIDE(OP): Performed by: EMERGENCY MEDICINE

## 2022-09-04 PROCEDURE — 96374 THER/PROPH/DIAG INJ IV PUSH: CPT

## 2022-09-04 PROCEDURE — 76705 ECHO EXAM OF ABDOMEN: CPT

## 2022-09-04 PROCEDURE — A9270 NON-COVERED ITEM OR SERVICE: HCPCS | Performed by: EMERGENCY MEDICINE

## 2022-09-04 PROCEDURE — 81001 URINALYSIS AUTO W/SCOPE: CPT

## 2022-09-04 PROCEDURE — 83690 ASSAY OF LIPASE: CPT

## 2022-09-04 PROCEDURE — 700111 HCHG RX REV CODE 636 W/ 250 OVERRIDE (IP): Performed by: EMERGENCY MEDICINE

## 2022-09-04 PROCEDURE — 74177 CT ABD & PELVIS W/CONTRAST: CPT

## 2022-09-04 PROCEDURE — 99285 EMERGENCY DEPT VISIT HI MDM: CPT

## 2022-09-04 PROCEDURE — 36415 COLL VENOUS BLD VENIPUNCTURE: CPT

## 2022-09-04 PROCEDURE — 700117 HCHG RX CONTRAST REV CODE 255: Performed by: EMERGENCY MEDICINE

## 2022-09-04 PROCEDURE — 85025 COMPLETE CBC W/AUTO DIFF WBC: CPT

## 2022-09-04 PROCEDURE — 96375 TX/PRO/DX INJ NEW DRUG ADDON: CPT

## 2022-09-04 PROCEDURE — 700105 HCHG RX REV CODE 258: Performed by: EMERGENCY MEDICINE

## 2022-09-04 PROCEDURE — 84703 CHORIONIC GONADOTROPIN ASSAY: CPT

## 2022-09-04 PROCEDURE — 80053 COMPREHEN METABOLIC PANEL: CPT

## 2022-09-04 RX ORDER — ONDANSETRON 2 MG/ML
4 INJECTION INTRAMUSCULAR; INTRAVENOUS ONCE
Status: COMPLETED | OUTPATIENT
Start: 2022-09-04 | End: 2022-09-04

## 2022-09-04 RX ORDER — SODIUM CHLORIDE 9 MG/ML
1000 INJECTION, SOLUTION INTRAVENOUS ONCE
Status: COMPLETED | OUTPATIENT
Start: 2022-09-04 | End: 2022-09-04

## 2022-09-04 RX ORDER — ONDANSETRON 4 MG/1
4 TABLET, ORALLY DISINTEGRATING ORAL EVERY 8 HOURS PRN
Qty: 6 TABLET | Refills: 0 | Status: SHIPPED | OUTPATIENT
Start: 2022-09-04 | End: 2022-09-06

## 2022-09-04 RX ORDER — FAMOTIDINE 20 MG/1
20 TABLET, FILM COATED ORAL 2 TIMES DAILY
Qty: 60 TABLET | Refills: 0 | Status: SHIPPED | OUTPATIENT
Start: 2022-09-04 | End: 2022-09-18

## 2022-09-04 RX ORDER — MORPHINE SULFATE 4 MG/ML
4 INJECTION INTRAVENOUS ONCE
Status: COMPLETED | OUTPATIENT
Start: 2022-09-04 | End: 2022-09-04

## 2022-09-04 RX ADMIN — SODIUM CHLORIDE 1000 ML: 9 INJECTION, SOLUTION INTRAVENOUS at 20:17

## 2022-09-04 RX ADMIN — ONDANSETRON 4 MG: 2 INJECTION INTRAMUSCULAR; INTRAVENOUS at 20:17

## 2022-09-04 RX ADMIN — MORPHINE SULFATE 4 MG: 4 INJECTION INTRAVENOUS at 20:17

## 2022-09-04 RX ADMIN — IOHEXOL 85 ML: 350 INJECTION, SOLUTION INTRAVENOUS at 21:15

## 2022-09-04 RX ADMIN — LIDOCAINE HYDROCHLORIDE 30 ML: 20 SOLUTION OROPHARYNGEAL at 21:13

## 2022-09-04 ASSESSMENT — LIFESTYLE VARIABLES: DO YOU DRINK ALCOHOL: NO

## 2022-09-04 NOTE — Clinical Note
Tami Jimenez was seen and treated in our emergency department on 9/4/2022.  She may return to work on 09/07/2022.       If you have any questions or concerns, please don't hesitate to call.      Kaitlin Fierro M.D.

## 2022-09-05 NOTE — ED PROVIDER NOTES
ED Provider Note    CHIEF COMPLAINT  Chief Complaint   Patient presents with    Abdominal Pain     Generalized, pt states pain started after eating a sandwich and initially felt like gas pain. +N/V       HPI  Tami Jimenez is a 22 y.o. female who presents to the ER complaining of a sudden onset of mid abdominal pain with associated nausea and vomiting which began around 330 this afternoon.  The patient states she was sitting on the couch watching TV when the pain began.  No history of similar pain in the past.  She vomited several times.  No blood in vomit.  She had a normal bowel movement earlier today.  No fevers or chills.  No pain with urination.  No cloudy or foul-smelling urine.  No blood in urine.  No vaginal discharge.  Last menstrual cycle was 4 days ago.  She does not believe she is pregnant.  No diarrhea.  Pain does not radiate into the back or into the shoulder blade.  There is a family history of gallbladder disease in grandmother.  She said the pain was a 10 out of 10 upon her onset.  Pain is  8 out of 10 at this time.  She tried some Pepto-Bismol but threw it up.    REVIEW OF SYSTEMS  See HPI for further details.  Positive for nausea, vomiting, abdominal pain, nasal congestion.  Negative for diarrhea, constipation, vaginal discharge, pain with urination, cloudy or foul-smelling urine, blood in stool, blood in urine, fever, chills.  All other systems are negative.    PAST MEDICAL HISTORY  Past Medical History:   Diagnosis Date    Migraines     no aura       FAMILY HISTORY  Family History   Problem Relation Age of Onset    Heart Disease Maternal Grandfather     Hypertension Maternal Grandfather     No Known Problems Mother     Heart Disease Paternal Grandmother         CABG    Heart Attack Paternal Grandfather     Alcohol abuse Paternal Grandfather     Diabetes Other        SOCIAL HISTORY  Social History     Socioeconomic History    Marital status: Single   Tobacco Use    Smoking status:  "Never    Smokeless tobacco: Never   Vaping Use    Vaping Use: Never used   Substance and Sexual Activity    Alcohol use: Yes     Comment: occ    Drug use: No    Sexual activity: Not Currently       SURGICAL HISTORY  Past Surgical History:   Procedure Laterality Date    EYE LACERATION REPAIR  1/25/2009    Performed by LARON MALCOLM at SURGERY [facility] ORS       CURRENT MEDICATIONS  Home Medications       Reviewed by Ariadne Merino R.N. (Registered Nurse) on 09/04/22 at 1807  Med List Status: Partial     Medication Last Dose Status   mupirocin (BACTROBAN) 2 % Ointment  Active                    ALLERGIES  No Known Allergies    PHYSICAL EXAM  VITAL SIGNS: /75   Pulse 90   Temp 36.1 °C (97 °F)   Resp 16   Ht 1.575 m (5' 2\")   Wt 93.1 kg (205 lb 4 oz)   SpO2 99%   BMI 37.54 kg/m²      Constitutional: Well developed, well nourished; No acute distress; Non-toxic appearance.   HENT: Normocephalic, atraumatic; Bilateral external ears normal;  Dry mucous membranes  Eyes: PERRL, EOMI, Conjunctiva normal. No discharge.   Neck:  Supple, nontender midline; No stridor; No nuchal rigidity.   Lymphatic: No cervical lymphadenopathy noted.   Cardiovascular: Regular rate and rhythm without murmurs, rubs, or gallop.   Thorax & Lungs: No respiratory distress, breath sounds clear to auscultation bilaterally without wheezing, rales or rhonchi. Nontender chest wall. No crepitus or subcutaneous air  Abdomen: Soft, elevated BMI, bowel sounds normal. No obvious masses; No pulsatile masses; no rebound, guarding, or peritoneal signs.   Skin: Good color; warm and dry without rash or petechia.  Back: Nontender, No CVA tenderness.    Extremities: Distal radial, dorsalis pedis, posterior tibial pulses are equal bilaterally; No edema; Nontender calves or saphenous, No cyanosis, No clubbing.   Musculoskeletal: Good range of motion in all major joints. No tenderness to palpation or major deformities noted.   Neurologic: Alert & " oriented x 4, clear speech        RADIOLOGY/PROCEDURES  CT-ABDOMEN-PELVIS WITH   Final Result         1. No acute inflammatory change in the abdomen or pelvis.      US-RUQ   Final Result      Negative right upper quadrant/gallbladder ultrasound           COURSE & MEDICAL DECISION MAKING  Pertinent Labs & Imaging studies reviewed. (See chart for details)  Results for orders placed or performed during the hospital encounter of 09/04/22   CBC WITH DIFFERENTIAL   Result Value Ref Range    WBC 8.8 4.8 - 10.8 K/uL    RBC 4.75 4.20 - 5.40 M/uL    Hemoglobin 14.9 12.0 - 16.0 g/dL    Hematocrit 43.2 37.0 - 47.0 %    MCV 90.9 81.4 - 97.8 fL    MCH 31.4 27.0 - 33.0 pg    MCHC 34.5 33.6 - 35.0 g/dL    RDW 44.4 35.9 - 50.0 fL    Platelet Count 255 164 - 446 K/uL    MPV 10.4 9.0 - 12.9 fL    Neutrophils-Polys 60.60 44.00 - 72.00 %    Lymphocytes 30.50 22.00 - 41.00 %    Monocytes 4.80 0.00 - 13.40 %    Eosinophils 2.80 0.00 - 6.90 %    Basophils 0.70 0.00 - 1.80 %    Immature Granulocytes 0.60 0.00 - 0.90 %    Nucleated RBC 0.00 /100 WBC    Neutrophils (Absolute) 5.36 2.00 - 7.15 K/uL    Lymphs (Absolute) 2.70 1.00 - 4.80 K/uL    Monos (Absolute) 0.42 0.00 - 0.85 K/uL    Eos (Absolute) 0.25 0.00 - 0.51 K/uL    Baso (Absolute) 0.06 0.00 - 0.12 K/uL    Immature Granulocytes (abs) 0.05 0.00 - 0.11 K/uL    NRBC (Absolute) 0.00 K/uL   COMP METABOLIC PANEL   Result Value Ref Range    Sodium 137 135 - 145 mmol/L    Potassium 3.7 3.6 - 5.5 mmol/L    Chloride 102 96 - 112 mmol/L    Co2 24 20 - 33 mmol/L    Anion Gap 11.0 7.0 - 16.0    Glucose 87 65 - 99 mg/dL    Bun 9 8 - 22 mg/dL    Creatinine 0.79 0.50 - 1.40 mg/dL    Calcium 9.3 8.5 - 10.5 mg/dL    AST(SGOT) 19 12 - 45 U/L    ALT(SGPT) 15 2 - 50 U/L    Alkaline Phosphatase 95 30 - 99 U/L    Total Bilirubin 0.2 0.1 - 1.5 mg/dL    Albumin 4.4 3.2 - 4.9 g/dL    Total Protein 7.6 6.0 - 8.2 g/dL    Globulin 3.2 1.9 - 3.5 g/dL    A-G Ratio 1.4 g/dL   LIPASE   Result Value Ref Range    Lipase  44 11 - 82 U/L   HCG QUAL SERUM   Result Value Ref Range    Beta-Hcg Qualitative Serum Negative Negative   URINALYSIS,CULTURE IF INDICATED    Specimen: Urine, Clean Catch   Result Value Ref Range    Color Yellow     Character Cloudy (A)     Specific Gravity 1.013 <1.035    Ph 8.0 5.0 - 8.0    Glucose Negative Negative mg/dL    Ketones Negative Negative mg/dL    Protein Negative Negative mg/dL    Bilirubin Negative Negative    Urobilinogen, Urine 0.2 Negative    Nitrite Negative Negative    Leukocyte Esterase Negative Negative    Occult Blood Negative Negative    Micro Urine Req Microscopic    URINE MICROSCOPIC (W/UA)   Result Value Ref Range    WBC 2-5 /hpf    RBC 2-5 (A) /hpf    Bacteria Negative None /hpf    Epithelial Cells Few /hpf    Hyaline Cast 0-2 /lpf   ESTIMATED GFR   Result Value Ref Range    GFR (CKD-EPI) 107 >60 mL/min/1.73 m 2   2130: Patient reports that she is feeling better with the medication.  Pain went from an 8 out of 10 prior to the morphine down to a 5 out of 10.  She then was given GI cocktail and pain went from a 5 out of 10 down to a 2 out of 10.  She still has some mild tenderness in the midepigastrium on repeat examination.  We discussed discharge plan and follow-up.  Patient and grandmother agree and understand.      Patient presents to the ER with complaint of abdominal pain with associated nausea and vomiting which began several hours prior to arrival.  She complained of pain in the periumbilical area.  She is mostly tender in the midepigastrium and right upper quadrant.  She never had pain like this before.  She vomited 3 times.  Emesis was nonbilious and nonbloody.  No diarrhea.  No fevers or chills.  No radiation of pain into the back.  No cough.  No urinary symptoms.  Urine is clear except for a few RBCs.  No signs of infection.  Gallbladder ultrasound was performed over concern for possible gallbladder pathology given the location and sudden onset of pain after eating grilled  cheese sandwich.  Gallbladder ultrasound is negative.  At this point I proceeded with a CT scan to evaluate for the possibility of appendicitis  Obstruction, perforation, intussusception, or any other dangerous intra-abdominal pathology.  CT scan is negative.  Patient is feeling better with medications here in the ER.  Vital signs are normal and stable.  She is not septic or toxic.  Labs are unremarkable.  No evidence of pancreatitis.  She is not pregnant.  No evidence of ectopic.  This time I think the patient is safe and stable for outpatient management discharge home.    She understands she must return to ER for recheck in 18 to 24 hours unless she is feeling better and completely pain-free.  She understands this could be early appendicitis or early cholecystitis.  She has been given strict return precautions and discharge instructions and she understands treatment plan and follow-up.    I verified that the patient was wearing a mask and I was wearing appropriate PPE every time I entered the room. The patient's mask was on the patient at all times during my encounter except for a brief view of the oropharynx.     FINAL IMPRESSION  1. Periumbilical abdominal pain Acute       2. Nausea and vomiting, intractability of vomiting not specified, unspecified vomiting type Acute ondansetron (ZOFRAN ODT) 4 MG TABLET DISPERSIBLE    famotidine (PEPCID) 20 MG Tab            This dictation has been created using voice recognition software. The accuracy of the dictation is limited by the abilities of the software. I expect there may be some errors of grammar and possibly content. I made every attempt to manually correct the errors within my dictation. However, errors related to voice recognition software may still exist and should be interpreted within the appropriate context.     Electronically signed by: Kaitlin Fierro M.D., 9/4/2022 6:39 PM

## 2022-09-05 NOTE — DISCHARGE INSTRUCTIONS
Return to the ER in 18 to 24 hours for recheck unless you are significantly improved and pain-free.    Return to the ER for any worsening abdominal pain, changing abdominal pain, recurrent nausea/vomiting, diarrhea, fevers over 100.4, shaking chills, cloudy or foul-smelling urine, pain with urination, blood in urine, blood in stool, or for any concerns.    Follow-up with your primary care APRN within the next 2 to 3 days.  Please call for appointment.    Clear liquid diet over the next 12 to 24 hours and then slowly advance your diet as tolerated.

## 2022-09-05 NOTE — ED TRIAGE NOTES
"Chief Complaint   Patient presents with    Abdominal Pain     Generalized, pt states pain started after eating a sandwich and initially felt like gas pain. +N/V     /89   Pulse 79   Temp 37 °C (98.6 °F) (Temporal)   Resp 16   Ht 1.575 m (5' 2\")   Wt 93.1 kg (205 lb 4 oz)   SpO2 100%   BMI 37.54 kg/m²     Pt ambulatory to triage for above, protocol ordered.   "

## 2022-09-07 ENCOUNTER — HOSPITAL ENCOUNTER (EMERGENCY)
Facility: MEDICAL CENTER | Age: 22
End: 2022-09-07
Attending: EMERGENCY MEDICINE
Payer: OTHER MISCELLANEOUS

## 2022-09-07 VITALS
OXYGEN SATURATION: 98 % | HEART RATE: 85 BPM | DIASTOLIC BLOOD PRESSURE: 89 MMHG | RESPIRATION RATE: 18 BRPM | TEMPERATURE: 97.9 F | WEIGHT: 205.47 LBS | SYSTOLIC BLOOD PRESSURE: 125 MMHG | HEIGHT: 62 IN | BODY MASS INDEX: 37.81 KG/M2

## 2022-09-07 DIAGNOSIS — R10.13 EPIGASTRIC PAIN: ICD-10-CM

## 2022-09-07 DIAGNOSIS — R11.0 NAUSEA: ICD-10-CM

## 2022-09-07 LAB
ALBUMIN SERPL BCP-MCNC: 4.6 G/DL (ref 3.2–4.9)
ALBUMIN/GLOB SERPL: 1.5 G/DL
ALP SERPL-CCNC: 91 U/L (ref 30–99)
ALT SERPL-CCNC: 17 U/L (ref 2–50)
ANION GAP SERPL CALC-SCNC: 12 MMOL/L (ref 7–16)
AST SERPL-CCNC: 16 U/L (ref 12–45)
BASOPHILS # BLD AUTO: 0.6 % (ref 0–1.8)
BASOPHILS # BLD: 0.04 K/UL (ref 0–0.12)
BILIRUB SERPL-MCNC: 0.3 MG/DL (ref 0.1–1.5)
BUN SERPL-MCNC: 7 MG/DL (ref 8–22)
CALCIUM SERPL-MCNC: 9.5 MG/DL (ref 8.5–10.5)
CHLORIDE SERPL-SCNC: 103 MMOL/L (ref 96–112)
CO2 SERPL-SCNC: 24 MMOL/L (ref 20–33)
CREAT SERPL-MCNC: 0.69 MG/DL (ref 0.5–1.4)
EOSINOPHIL # BLD AUTO: 0.1 K/UL (ref 0–0.51)
EOSINOPHIL NFR BLD: 1.4 % (ref 0–6.9)
ERYTHROCYTE [DISTWIDTH] IN BLOOD BY AUTOMATED COUNT: 45.2 FL (ref 35.9–50)
GFR SERPLBLD CREATININE-BSD FMLA CKD-EPI: 124 ML/MIN/1.73 M 2
GLOBULIN SER CALC-MCNC: 3 G/DL (ref 1.9–3.5)
GLUCOSE SERPL-MCNC: 89 MG/DL (ref 65–99)
HCG SERPL QL: NEGATIVE
HCT VFR BLD AUTO: 43.8 % (ref 37–47)
HGB BLD-MCNC: 14.7 G/DL (ref 12–16)
IMM GRANULOCYTES # BLD AUTO: 0.01 K/UL (ref 0–0.11)
IMM GRANULOCYTES NFR BLD AUTO: 0.1 % (ref 0–0.9)
LIPASE SERPL-CCNC: 28 U/L (ref 11–82)
LYMPHOCYTES # BLD AUTO: 1.79 K/UL (ref 1–4.8)
LYMPHOCYTES NFR BLD: 24.9 % (ref 22–41)
MCH RBC QN AUTO: 30.8 PG (ref 27–33)
MCHC RBC AUTO-ENTMCNC: 33.6 G/DL (ref 33.6–35)
MCV RBC AUTO: 91.8 FL (ref 81.4–97.8)
MONOCYTES # BLD AUTO: 0.32 K/UL (ref 0–0.85)
MONOCYTES NFR BLD AUTO: 4.5 % (ref 0–13.4)
NEUTROPHILS # BLD AUTO: 4.92 K/UL (ref 2–7.15)
NEUTROPHILS NFR BLD: 68.5 % (ref 44–72)
NRBC # BLD AUTO: 0 K/UL
NRBC BLD-RTO: 0 /100 WBC
PLATELET # BLD AUTO: 248 K/UL (ref 164–446)
PMV BLD AUTO: 10.7 FL (ref 9–12.9)
POTASSIUM SERPL-SCNC: 4 MMOL/L (ref 3.6–5.5)
PROT SERPL-MCNC: 7.6 G/DL (ref 6–8.2)
RBC # BLD AUTO: 4.77 M/UL (ref 4.2–5.4)
SODIUM SERPL-SCNC: 139 MMOL/L (ref 135–145)
WBC # BLD AUTO: 7.2 K/UL (ref 4.8–10.8)

## 2022-09-07 PROCEDURE — 36415 COLL VENOUS BLD VENIPUNCTURE: CPT

## 2022-09-07 PROCEDURE — 700102 HCHG RX REV CODE 250 W/ 637 OVERRIDE(OP): Performed by: EMERGENCY MEDICINE

## 2022-09-07 PROCEDURE — 99284 EMERGENCY DEPT VISIT MOD MDM: CPT

## 2022-09-07 PROCEDURE — 80053 COMPREHEN METABOLIC PANEL: CPT

## 2022-09-07 PROCEDURE — 83690 ASSAY OF LIPASE: CPT

## 2022-09-07 PROCEDURE — 700111 HCHG RX REV CODE 636 W/ 250 OVERRIDE (IP): Performed by: EMERGENCY MEDICINE

## 2022-09-07 PROCEDURE — 84703 CHORIONIC GONADOTROPIN ASSAY: CPT

## 2022-09-07 PROCEDURE — 85025 COMPLETE CBC W/AUTO DIFF WBC: CPT

## 2022-09-07 PROCEDURE — A9270 NON-COVERED ITEM OR SERVICE: HCPCS | Performed by: EMERGENCY MEDICINE

## 2022-09-07 RX ORDER — ONDANSETRON 2 MG/ML
4 INJECTION INTRAMUSCULAR; INTRAVENOUS ONCE
Status: DISCONTINUED | OUTPATIENT
Start: 2022-09-07 | End: 2022-09-07

## 2022-09-07 RX ORDER — ONDANSETRON 4 MG/1
4 TABLET, ORALLY DISINTEGRATING ORAL ONCE
Status: COMPLETED | OUTPATIENT
Start: 2022-09-07 | End: 2022-09-07

## 2022-09-07 RX ORDER — OMEPRAZOLE 20 MG/1
20 CAPSULE, DELAYED RELEASE ORAL DAILY
Qty: 30 CAPSULE | Refills: 0 | Status: SHIPPED | OUTPATIENT
Start: 2022-09-07

## 2022-09-07 RX ADMIN — ONDANSETRON 4 MG: 4 TABLET, ORALLY DISINTEGRATING ORAL at 12:00

## 2022-09-07 RX ADMIN — LIDOCAINE HYDROCHLORIDE 30 ML: 20 SOLUTION OROPHARYNGEAL at 12:00

## 2022-09-07 ASSESSMENT — ENCOUNTER SYMPTOMS
DIZZINESS: 0
SHORTNESS OF BREATH: 0
NAUSEA: 1
ABDOMINAL PAIN: 1
FEVER: 0
CHILLS: 0
HEADACHES: 0
MYALGIAS: 0

## 2022-09-07 ASSESSMENT — FIBROSIS 4 INDEX: FIB4 SCORE: 0.46

## 2022-09-07 NOTE — ED PROVIDER NOTES
ED Provider Note      Primary care provider: RODRIGO Morrow  Means of arrival: private vehicle  History obtained from: patient  History limited by: none    CHIEF COMPLAINT  Chief Complaint   Patient presents with    Abdominal Pain     Pt states she is having abdominal pain primarily RUQ for 4x days. Pt states she is having normal BM.     N/V       HPI  Tami Jimenez is a 22 y.o. female who presents to the Emergency Department for evaluation of abdominal pain.  Patient reports for the past 4 days she has had abdominal pain that is primarily in the epigastric region and radiates to her bilateral upper quadrant.  She was seen here on 9/4/2022 for evaluation of her pain and a CT of the abdomen was unremarkable at that time.  Right upper quadrant ultrasound demonstrated no acute findings, no cholelithiasis.  Patient reports that she was discharged home on a clear liquid diet and initially felt well the first day but then pain resumed again yesterday and therefore she came in for repeat evaluation.  She reports associated nausea, had vomiting the first day of pain but vomiting has subsequently resolved.  She describes the pain as a burning pain to the epigastric region.  Denies fevers, chills, or chest pain.    Record review demonstrates CT of the abdomen and right upper quadrant ultrasound were negative on 9/4/2022 for acute pathology.  Labs were unremarkable at that time.    REVIEW OF SYSTEMS  Review of Systems   Constitutional:  Negative for chills and fever.   Respiratory:  Negative for shortness of breath.    Cardiovascular:  Negative for chest pain.   Gastrointestinal:  Positive for abdominal pain and nausea.   Genitourinary:  Negative for dysuria.   Musculoskeletal:  Negative for myalgias.   Neurological:  Negative for dizziness and headaches.   All other systems reviewed and are negative.      PAST MEDICAL HISTORY   has a past medical history of Migraines.    SURGICAL HISTORY   has a  "past surgical history that includes eye laceration repair (1/25/2009).    SOCIAL HISTORY  Social History     Tobacco Use    Smoking status: Never    Smokeless tobacco: Never   Vaping Use    Vaping Use: Never used   Substance Use Topics    Alcohol use: Yes     Comment: occ    Drug use: No      Social History     Substance and Sexual Activity   Drug Use No       FAMILY HISTORY  Family History   Problem Relation Age of Onset    Heart Disease Maternal Grandfather     Hypertension Maternal Grandfather     No Known Problems Mother     Heart Disease Paternal Grandmother         CABG    Heart Attack Paternal Grandfather     Alcohol abuse Paternal Grandfather     Diabetes Other        CURRENT MEDICATIONS  Home Medications       Reviewed by Alba Frazier R.N. (Registered Nurse) on 09/07/22 at 1134  Med List Status: Partial     Medication Last Dose Status   famotidine (PEPCID) 20 MG Tab  Active   mupirocin (BACTROBAN) 2 % Ointment  Active                    ALLERGIES  No Known Allergies    PHYSICAL EXAM  VITAL SIGNS: /83   Pulse 90   Temp 36.1 °C (97 °F) (Temporal)   Resp 16   Ht 1.575 m (5' 2\")   Wt 93.2 kg (205 lb 7.5 oz)   SpO2 99%   BMI 37.58 kg/m²   Vitals reviewed by myself.  Physical Exam  Nursing note and vitals reviewed.  Constitutional: Well-developed and well-nourished. No acute distress.   HENT: Head is normocephalic and atraumatic.  Eyes: extra-ocular movements intact  Cardiovascular: Regular rate and  regular rhythm. No murmur heard.  Pulmonary/Chest: Breath sounds normal. No wheezes or rales.   Abdominal: Soft and non-tender. No distention.  Negative Carty sign, negative McBurney's point tenderness, no rebound or guarding  Musculoskeletal: Extremities exhibit normal range of motion without edema or tenderness.   Neurological: Awake and alert  Skin: Skin is warm and dry. No rash.         DIAGNOSTIC STUDIES /  LABS  Labs Reviewed   COMP METABOLIC PANEL - Abnormal; Notable for the following " components:       Result Value    Bun 7 (*)     All other components within normal limits   CBC WITH DIFFERENTIAL   LIPASE   HCG QUAL SERUM   ESTIMATED GFR   URINALYSIS,CULTURE IF INDICATED       All labs reviewed by me.      COURSE & MEDICAL DECISION MAKING  Nursing notes, VS PMSFHx reviewed in chart.    Patient is a 22-year-old female who comes in for evaluation of abdominal pain.  Differential diagnosis includes gastritis, peptic ulcer disease, esophagitis.  I feel biliary pathology is less likely given normal right upper quadrant ultrasound.  I advised patient we will obtain repeat labs and check LFTs to assess for any signs of biliary pathology.  We will also obtain lipase to assess for pancreatitis and CBC to assess for leukocytosis for possible intra-abdominal pathology such as appendicitis.  Patient is amenable to this plan.    Patient's initial vitals are within normal limits and she is well-appearing on exam.  She is treated with GI cocktail and Zofran.  After treatment patient is feeling greatly improved.  Labs returned and are unremarkable.  She has no leukocytosis and abdominal exam is benign making appendicitis less likely, especially given normal recent CT scan.  Lipase is within normal limits ruling out pancreatitis.  LFTs are within normal limits making biliary pathology less likely.  As patient is feeling improved I advised her that this may be gastritis versus peptic ulcer disease.  I will start her on antacid and have her follow-up with gastroenterology.  I have also advised on dietary changes that would be useful for this.  She is then given strict return precautions and discharged in stable condition.      FINAL IMPRESSION  1. Epigastric pain    2. Nausea

## 2022-09-07 NOTE — ED TRIAGE NOTES
Taim Jimenez  22 y.o. female  Chief Complaint   Patient presents with    Abdominal Pain     Pt states she is having abdominal pain primarily RUQ for 4x days. Pt states she is having normal BM.     N/V       Vitals:    09/07/22 1107   BP: 113/83   Pulse: 90   Resp: 16   Temp: 36.1 °C (97 °F)   SpO2: 99%       Patient educated on triage process and encouraged to alert staff of any changes in condition.    Pt ambulatory to triage for the above complaint. Pt was seen and dc'd here on this last Sunday after a full workup. Pt was told it may be related to her gallbladder and to return to ER if symptoms didn't reside.

## 2022-09-08 ENCOUNTER — HOSPITAL ENCOUNTER (OUTPATIENT)
Dept: LAB | Facility: MEDICAL CENTER | Age: 22
End: 2022-09-08
Attending: NURSE PRACTITIONER
Payer: OTHER MISCELLANEOUS

## 2022-09-08 ENCOUNTER — OFFICE VISIT (OUTPATIENT)
Dept: MEDICAL GROUP | Facility: MEDICAL CENTER | Age: 22
End: 2022-09-08
Payer: OTHER MISCELLANEOUS

## 2022-09-08 VITALS
SYSTOLIC BLOOD PRESSURE: 108 MMHG | BODY MASS INDEX: 38.14 KG/M2 | HEART RATE: 93 BPM | DIASTOLIC BLOOD PRESSURE: 64 MMHG | WEIGHT: 207.23 LBS | OXYGEN SATURATION: 97 % | TEMPERATURE: 97.2 F | HEIGHT: 62 IN

## 2022-09-08 DIAGNOSIS — R53.83 OTHER FATIGUE: ICD-10-CM

## 2022-09-08 DIAGNOSIS — K29.70 GASTRITIS WITHOUT BLEEDING, UNSPECIFIED CHRONICITY, UNSPECIFIED GASTRITIS TYPE: ICD-10-CM

## 2022-09-08 DIAGNOSIS — R10.13 EPIGASTRIC PAIN: ICD-10-CM

## 2022-09-08 DIAGNOSIS — E66.9 OBESITY (BMI 35.0-39.9 WITHOUT COMORBIDITY): ICD-10-CM

## 2022-09-08 LAB — TSH SERPL DL<=0.005 MIU/L-ACNC: 0.89 UIU/ML (ref 0.38–5.33)

## 2022-09-08 PROCEDURE — 84443 ASSAY THYROID STIM HORMONE: CPT

## 2022-09-08 PROCEDURE — 36415 COLL VENOUS BLD VENIPUNCTURE: CPT

## 2022-09-08 PROCEDURE — 99213 OFFICE O/P EST LOW 20 MIN: CPT | Performed by: NURSE PRACTITIONER

## 2022-09-08 ASSESSMENT — FIBROSIS 4 INDEX: FIB4 SCORE: 0.38

## 2022-09-08 ASSESSMENT — PATIENT HEALTH QUESTIONNAIRE - PHQ9
CLINICAL INTERPRETATION OF PHQ2 SCORE: 1
5. POOR APPETITE OR OVEREATING: 1 - SEVERAL DAYS
SUM OF ALL RESPONSES TO PHQ QUESTIONS 1-9: 7

## 2022-09-08 NOTE — LETTER
September 8, 2022        Tami Jimenez  [address] [facility] [id number]        Ms. Tami Jimenez was seen in the clinic on September 8th, 2022 for an appointment.     If you have any questions or concerns, please don't hesitate to call.        Sincerely,        CHIQUITA Morrow.    Electronically Signed

## 2022-09-08 NOTE — PROGRESS NOTES
Chief Complaint   Patient presents with    Hospital Follow-up       Subjective:     HPI:     Tami Jimenez is a 22 y.o. female   here to discuss the evaluation and management of:       Patient presents today to follow-up from the emergency room.  Patient was seen twice in the last week for abdominal pain.  Labs are essentially unremarkable.  Ultrasound and CT scan of abdomen were unremarkable for any acute findings.  She was started on omeprazole yesterday.  Patient states that symptoms have started to improve.  There is also a referral in for GI.  Her pain is in her Epigastric region.  No n/v  No tobacco  Trying to avoid food triggers .  No blood or black tarry stools.   No hematemesis   Rare ETOH  No NSAIDs  Has GI referral placed by ER.    Feeling very tired lately even before having these symptoms.   TSH not checked recently.       ROS: : see above      Current Outpatient Medications:     omeprazole (PRILOSEC) 20 MG delayed-release capsule, Take 1 Capsule by mouth every day., Disp: 30 Capsule, Rfl: 0    famotidine (PEPCID) 20 MG Tab, Take 1 Tablet by mouth 2 times a day for 14 days., Disp: 60 Tablet, Rfl: 0    mupirocin (BACTROBAN) 2 % Ointment, Apply 1 Application topically 2 times a day., Disp: 22 g, Rfl: 0    No Known Allergies    Past Medical History:   Diagnosis Date    Migraines     no aura     Past Surgical History:   Procedure Laterality Date    EYE LACERATION REPAIR  1/25/2009    Performed by LARON MALCOLM at SURGERY [facility] ORS     Family History   Problem Relation Age of Onset    Heart Disease Maternal Grandfather     Hypertension Maternal Grandfather     No Known Problems Mother     Heart Disease Paternal Grandmother         CABG    Heart Attack Paternal Grandfather     Alcohol abuse Paternal Grandfather     Diabetes Other      Social History     Socioeconomic History    Marital status: Single     Spouse name: Not on file    Number of children: Not on file    Years of education: Not  "on file    Highest education level: Not on file   Occupational History    Not on file   Tobacco Use    Smoking status: Never    Smokeless tobacco: Never   Vaping Use    Vaping Use: Never used   Substance and Sexual Activity    Alcohol use: Yes     Comment: occ    Drug use: No    Sexual activity: Not Currently   Other Topics Concern    Not on file   Social History Narrative    Not on file     Social Determinants of Health     Financial Resource Strain: Not on file   Food Insecurity: Not on file   Transportation Needs: Not on file   Physical Activity: Not on file   Stress: Not on file   Social Connections: Not on file   Intimate Partner Violence: Not on file   Housing Stability: Not on file       Objective:     Vitals: /64 (BP Location: Right arm, Patient Position: Sitting, BP Cuff Size: Adult)   Pulse 93   Temp 36.2 °C (97.2 °F) (Temporal)   Ht 1.575 m (5' 2\")   Wt 94 kg (207 lb 3.7 oz)   SpO2 97%   BMI 37.90 kg/m²    General: Alert, pleasant, NAD  HEENT: Normocephalic.  Neck supple.   Respiratory: no distress, no audible wheezing, RR -WNL  Skin: Warm, dry, no rashes.  Extremities: No leg edema. No discoloration  Neurological: No tremors  Psych:  Affect/mood is normal, judgement is good, memory is intact, grooming is appropriate.    Assessment/Plan:     Tami was seen today for hospital follow-up.    Diagnoses and all orders for this visit:    Epigastric pain  Gastritis without bleeding, unspecified chronicity, unspecified gastritis type  Continue Omeprazole.  Follow-up with GI..  Avoid trigger foods.    Other fatigue  Check TSH.  -     TSH WITH REFLEX TO FT4; Future    Obesity (BMI 35.0-39.9 without comorbidity) ([facility])  -     Patient identified as having weight management issue.  Appropriate orders and counseling given.      Return if symptoms worsen or fail to improve.          Laurence WALLACE"

## 2022-11-15 ENCOUNTER — HOSPITAL ENCOUNTER (OUTPATIENT)
Dept: LAB | Facility: MEDICAL CENTER | Age: 22
End: 2022-11-15
Attending: NURSE PRACTITIONER
Payer: OTHER MISCELLANEOUS

## 2022-11-15 ENCOUNTER — OFFICE VISIT (OUTPATIENT)
Dept: MEDICAL GROUP | Facility: MEDICAL CENTER | Age: 22
End: 2022-11-15
Payer: OTHER MISCELLANEOUS

## 2022-11-15 VITALS
HEIGHT: 62 IN | HEART RATE: 86 BPM | OXYGEN SATURATION: 97 % | DIASTOLIC BLOOD PRESSURE: 70 MMHG | TEMPERATURE: 96.9 F | BODY MASS INDEX: 38.3 KG/M2 | WEIGHT: 208.11 LBS | SYSTOLIC BLOOD PRESSURE: 112 MMHG

## 2022-11-15 DIAGNOSIS — E55.9 VITAMIN D DEFICIENCY: ICD-10-CM

## 2022-11-15 DIAGNOSIS — M79.10 MYALGIA: ICD-10-CM

## 2022-11-15 DIAGNOSIS — E66.9 OBESITY (BMI 35.0-39.9 WITHOUT COMORBIDITY): ICD-10-CM

## 2022-11-15 DIAGNOSIS — R53.83 OTHER FATIGUE: ICD-10-CM

## 2022-11-15 LAB
25(OH)D3 SERPL-MCNC: 23 NG/ML (ref 30–100)
ALBUMIN SERPL BCP-MCNC: 4.1 G/DL (ref 3.2–4.9)
ALBUMIN/GLOB SERPL: 1.3 G/DL
ALP SERPL-CCNC: 92 U/L (ref 30–99)
ALT SERPL-CCNC: 18 U/L (ref 2–50)
ANION GAP SERPL CALC-SCNC: 13 MMOL/L (ref 7–16)
APPEARANCE UR: CLEAR
AST SERPL-CCNC: 19 U/L (ref 12–45)
BACTERIA #/AREA URNS HPF: NEGATIVE /HPF
BASOPHILS # BLD AUTO: 0.9 % (ref 0–1.8)
BASOPHILS # BLD: 0.06 K/UL (ref 0–0.12)
BILIRUB SERPL-MCNC: 0.3 MG/DL (ref 0.1–1.5)
BILIRUB UR QL STRIP.AUTO: NEGATIVE
BUN SERPL-MCNC: 9 MG/DL (ref 8–22)
CALCIUM SERPL-MCNC: 9.3 MG/DL (ref 8.5–10.5)
CHLORIDE SERPL-SCNC: 107 MMOL/L (ref 96–112)
CK SERPL-CCNC: 291 U/L (ref 0–154)
CO2 SERPL-SCNC: 21 MMOL/L (ref 20–33)
COLOR UR: YELLOW
CREAT SERPL-MCNC: 0.6 MG/DL (ref 0.5–1.4)
EOSINOPHIL # BLD AUTO: 0.17 K/UL (ref 0–0.51)
EOSINOPHIL NFR BLD: 2.5 % (ref 0–6.9)
EPI CELLS #/AREA URNS HPF: NEGATIVE /HPF
ERYTHROCYTE [DISTWIDTH] IN BLOOD BY AUTOMATED COUNT: 46.2 FL (ref 35.9–50)
ERYTHROCYTE [SEDIMENTATION RATE] IN BLOOD BY WESTERGREN METHOD: 8 MM/HOUR (ref 0–25)
GFR SERPLBLD CREATININE-BSD FMLA CKD-EPI: 128 ML/MIN/1.73 M 2
GLOBULIN SER CALC-MCNC: 3.1 G/DL (ref 1.9–3.5)
GLUCOSE SERPL-MCNC: 91 MG/DL (ref 65–99)
GLUCOSE UR STRIP.AUTO-MCNC: NEGATIVE MG/DL
HAV IGM SERPL QL IA: NORMAL
HBV CORE IGM SER QL: NORMAL
HBV SURFACE AG SER QL: NORMAL
HCT VFR BLD AUTO: 43.5 % (ref 37–47)
HCV AB SER QL: NORMAL
HGB BLD-MCNC: 14.4 G/DL (ref 12–16)
HYALINE CASTS #/AREA URNS LPF: ABNORMAL /LPF
IMM GRANULOCYTES # BLD AUTO: 0.02 K/UL (ref 0–0.11)
IMM GRANULOCYTES NFR BLD AUTO: 0.3 % (ref 0–0.9)
KETONES UR STRIP.AUTO-MCNC: NEGATIVE MG/DL
LEUKOCYTE ESTERASE UR QL STRIP.AUTO: NEGATIVE
LYMPHOCYTES # BLD AUTO: 1.9 K/UL (ref 1–4.8)
LYMPHOCYTES NFR BLD: 28.3 % (ref 22–41)
MCH RBC QN AUTO: 30.8 PG (ref 27–33)
MCHC RBC AUTO-ENTMCNC: 33.1 G/DL (ref 33.6–35)
MCV RBC AUTO: 93.1 FL (ref 81.4–97.8)
MICRO URNS: ABNORMAL
MONOCYTES # BLD AUTO: 0.36 K/UL (ref 0–0.85)
MONOCYTES NFR BLD AUTO: 5.4 % (ref 0–13.4)
NEUTROPHILS # BLD AUTO: 4.2 K/UL (ref 2–7.15)
NEUTROPHILS NFR BLD: 62.6 % (ref 44–72)
NITRITE UR QL STRIP.AUTO: NEGATIVE
NRBC # BLD AUTO: 0 K/UL
NRBC BLD-RTO: 0 /100 WBC
PH UR STRIP.AUTO: 7.5 [PH] (ref 5–8)
PLATELET # BLD AUTO: 215 K/UL (ref 164–446)
PMV BLD AUTO: 11.3 FL (ref 9–12.9)
POTASSIUM SERPL-SCNC: 4.1 MMOL/L (ref 3.6–5.5)
PROT SERPL-MCNC: 7.2 G/DL (ref 6–8.2)
PROT UR QL STRIP: NEGATIVE MG/DL
RBC # BLD AUTO: 4.67 M/UL (ref 4.2–5.4)
RBC # URNS HPF: ABNORMAL /HPF
RBC UR QL AUTO: ABNORMAL
RHEUMATOID FACT SER IA-ACNC: <10 IU/ML (ref 0–14)
SODIUM SERPL-SCNC: 141 MMOL/L (ref 135–145)
SP GR UR STRIP.AUTO: 1.02
UROBILINOGEN UR STRIP.AUTO-MCNC: 0.2 MG/DL
WBC # BLD AUTO: 6.7 K/UL (ref 4.8–10.8)
WBC #/AREA URNS HPF: ABNORMAL /HPF

## 2022-11-15 PROCEDURE — 85025 COMPLETE CBC W/AUTO DIFF WBC: CPT

## 2022-11-15 PROCEDURE — 86038 ANTINUCLEAR ANTIBODIES: CPT

## 2022-11-15 PROCEDURE — 85652 RBC SED RATE AUTOMATED: CPT

## 2022-11-15 PROCEDURE — 81001 URINALYSIS AUTO W/SCOPE: CPT

## 2022-11-15 PROCEDURE — 80053 COMPREHEN METABOLIC PANEL: CPT

## 2022-11-15 PROCEDURE — 82306 VITAMIN D 25 HYDROXY: CPT

## 2022-11-15 PROCEDURE — 82550 ASSAY OF CK (CPK): CPT

## 2022-11-15 PROCEDURE — 86431 RHEUMATOID FACTOR QUANT: CPT

## 2022-11-15 PROCEDURE — 86665 EPSTEIN-BARR CAPSID VCA: CPT

## 2022-11-15 PROCEDURE — 99214 OFFICE O/P EST MOD 30 MIN: CPT | Performed by: NURSE PRACTITIONER

## 2022-11-15 PROCEDURE — 36415 COLL VENOUS BLD VENIPUNCTURE: CPT

## 2022-11-15 PROCEDURE — 80074 ACUTE HEPATITIS PANEL: CPT

## 2022-11-15 RX ORDER — COVID-19 MOLECULAR TEST ASSAY
KIT MISCELLANEOUS
COMMUNITY
Start: 2022-09-28 | End: 2022-11-15

## 2022-11-15 RX ORDER — ONDANSETRON 4 MG/1
TABLET, ORALLY DISINTEGRATING ORAL
COMMUNITY
Start: 2022-09-07

## 2022-11-15 ASSESSMENT — FIBROSIS 4 INDEX: FIB4 SCORE: 0.38

## 2022-11-15 NOTE — PROGRESS NOTES
"Chief Complaint   Patient presents with    Muscle Pain     All over body, achy feeling x 3 yrs     Subjective:     HPI:     Tami Jimenez is a 22 y.o. female   here to discuss the evaluation and management of:     Generalized muscle pain  Ongoing for years.  Previous work up in 2019 was negative for RA, CHATA, normal TH.   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  Recent TSH levels within acceptable limits.  She describes her symptoms as \"growing pains\" and her \"muscles are sore to touch\"   No numbness/tingling.   UE worse than LE  No joint pain.  No rashes  No fevers.  No swelling.   Sensitive in muscles and aching in her body.   Has been dealing with her symptoms.   Symptoms are mostly constant  No pattern to her symptoms.   She has not noticed if any particular food or activity or increase in stress exacerbates her symptoms.  Sleeping can improve symptoms.   OTC NSAIDs can dull symptoms.  No excessive bleeding.   Some bruising but does not last long.   Some fatigue.   No eye infections.   No new medications  Denies dry eye/mouth  Food does not make a difference.    ROS: : see above      Current Outpatient Medications:     ondansetron (ZOFRAN ODT) 4 MG TABLET DISPERSIBLE, DISSOLVE 1 TABLET ON THE TONGUE EVERY 8 HOURS FOR UP TO 2 DAYS AS NEEDED FOR NAUSEA, Disp: , Rfl:     omeprazole (PRILOSEC) 20 MG delayed-release capsule, Take 1 Capsule by mouth every day., Disp: 30 Capsule, Rfl: 0    No Known Allergies    Past Medical History:   Diagnosis Date    Migraines     no aura     Past Surgical History:   Procedure Laterality Date    EYE LACERATION REPAIR  1/25/2009    Performed by LARON MALCOLM at [facility] ORS     Family History   Problem Relation Age of Onset    Heart Disease Maternal Grandfather     Hypertension Maternal Grandfather     No Known Problems Mother     Heart Disease Paternal Grandmother         CABG    Heart Attack Paternal Grandfather     Alcohol abuse Paternal Grandfather     " "Diabetes Other      Social History     Socioeconomic History    Marital status: Single     Spouse name: Not on file    Number of children: Not on file    Years of education: Not on file    Highest education level: Not on file   Occupational History    Not on file   Tobacco Use    Smoking status: Never    Smokeless tobacco: Never   Vaping Use    Vaping Use: Never used   Substance and Sexual Activity    Alcohol use: Yes     Comment: occ    Drug use: No    Sexual activity: Not Currently   Other Topics Concern    Not on file   Social History Narrative    Not on file     Social Determinants of Health     Financial Resource Strain: Not on file   Food Insecurity: Not on file   Transportation Needs: Not on file   Physical Activity: Not on file   Stress: Not on file   Social Connections: Not on file   Intimate Partner Violence: Not on file   Housing Stability: Not on file       Objective:     Vitals: /70 (BP Location: Left arm, Patient Position: Sitting, BP Cuff Size: Adult)   Pulse 86   Temp 36.1 °C (96.9 °F) (Temporal)   Ht 1.575 m (5' 2\")   Wt 94.4 kg (208 lb 1.8 oz)   SpO2 97%   BMI 38.06 kg/m²    General: Alert, pleasant, NAD  HEENT: Normocephalic.  Neck supple.   Respiratory: no distress, no audible wheezing, RR -WNL  Skin: Warm, dry, no rashes.  Extremities: No leg edema. No discoloration  Neurological: No tremors  Psych:  Affect/mood is normal, judgement is good, memory is intact, grooming is appropriate.    Assessment/Plan:     Tami was seen today for muscle pain.    Diagnoses and all orders for this visit:    Myalgia  Chronic problem, uncertain diagnosis at this time.  We will start basic work-up and follow-up to review.    -     Sed Rate; Future  -     RHEUMATOID ARTHRITIS FACTOR; Future  -     CREATINE KINASE; Future  -     CBC WITH DIFFERENTIAL; Future  -     Comp Metabolic Panel; Future  -     URINALYSIS,CULTURE IF INDICATED; Future  -     EBV VCA AB IGG; Future  -     VITAMIN D,25 HYDROXY " (DEFICIENCY); Future  -     HEPATITIS PANEL ACUTE(4 COMPONENTS); Future  -     CHATA REFLEXIVE PROFILE; Future    Other fatigue  Chronic problem, uncertain diagnosis at this time.  Complete basic work-up and follow-up to review.  -     Sed Rate; Future  -     RHEUMATOID ARTHRITIS FACTOR; Future  -     CREATINE KINASE; Future  -     CBC WITH DIFFERENTIAL; Future  -     Comp Metabolic Panel; Future  -     URINALYSIS,CULTURE IF INDICATED; Future  -     EBV VCA AB IGG; Future  -     VITAMIN D,25 HYDROXY (DEFICIENCY); Future  -     HEPATITIS PANEL ACUTE(4 COMPONENTS); Future  -     CHATA REFLEXIVE PROFILE; Future    Vitamin D deficiency  -     VITAMIN D,25 HYDROXY (DEFICIENCY); Future    Obesity (BMI 35.0-39.9 without comorbidity) (HCC)  Ongoing problem.  Encouraged lifestyle modification      Return for Lab results.          Laurence PORRAS.

## 2022-11-16 LAB
EBV VCA IGG SER IA-ACNC: >750 U/ML (ref 0–21.9)
NUCLEAR IGG SER QL IA: NORMAL

## 2022-12-05 ENCOUNTER — OFFICE VISIT (OUTPATIENT)
Dept: MEDICAL GROUP | Facility: MEDICAL CENTER | Age: 22
End: 2022-12-05
Payer: OTHER MISCELLANEOUS

## 2022-12-05 VITALS
WEIGHT: 209 LBS | RESPIRATION RATE: 16 BRPM | HEART RATE: 80 BPM | TEMPERATURE: 97.6 F | HEIGHT: 62 IN | BODY MASS INDEX: 38.46 KG/M2 | DIASTOLIC BLOOD PRESSURE: 68 MMHG | OXYGEN SATURATION: 100 % | SYSTOLIC BLOOD PRESSURE: 120 MMHG

## 2022-12-05 DIAGNOSIS — M79.10 MYALGIA: ICD-10-CM

## 2022-12-05 DIAGNOSIS — Z86.19 HISTORY OF EPSTEIN-BARR VIRUS INFECTION: ICD-10-CM

## 2022-12-05 DIAGNOSIS — R74.8 ELEVATED CPK: ICD-10-CM

## 2022-12-05 DIAGNOSIS — R31.9 HEMATURIA, UNSPECIFIED TYPE: ICD-10-CM

## 2022-12-05 DIAGNOSIS — E55.9 VITAMIN D DEFICIENCY: ICD-10-CM

## 2022-12-05 PROCEDURE — 99214 OFFICE O/P EST MOD 30 MIN: CPT | Performed by: NURSE PRACTITIONER

## 2022-12-05 ASSESSMENT — FIBROSIS 4 INDEX: FIB4 SCORE: 0.5

## 2022-12-05 NOTE — PROGRESS NOTES
Chief Complaint   Patient presents with    Follow-Up     Subjective:     HPI:     Tami Jimenez is a 22 y.o. female   here to discuss the evaluation and management of:     Follow up labs:     Patient follow-up from last office visit where she had been reporting generalized muscle pain for several years.  Additional blood work was completed with the following findings:    RF negative, CHATA negative, normal sed rate, CBC normal, CMP normal.  Thyroid normal. Negative hepatitis screening    Elevated CPK  Present on recent labs.  Not on any medications that may be contributing.  No routine strenuous exercise.  Denies illicit substances.  Denies any recent trauma or injury   Latest Reference Range & Units 11/15/22 08:45   CPK Total 0 - 154 U/L 291 (H)     +EBV IGG  Value trending up.   Latest Reference Range & Units 11/15/22 08:45   Ebv Ab Vca, Igg 0.0 - 21.9 U/mL >750.0 (H)     Hematuria  Present on most recent labs  Reports on her menstrual cycle during urine test   Latest Reference Range & Units 11/15/22 08:44   Color  Yellow   Character  Clear   Specific Gravity <1.035  1.019   Ph 5.0 - 8.0  7.5   Glucose Negative mg/dL Negative   Ketones Negative mg/dL Negative   Bilirubin Negative  Negative   Occult Blood Negative  Large !   Protein Negative mg/dL Negative   Nitrite Negative  Negative   Leukocyte Esterase Negative  Negative   Urobilinogen, Urine Negative  0.2   Micro Urine Req  Microscopic   WBC /hpf 0-2   RBC /hpf 20-50 !   Epithelial Cells /hpf Negative   Bacteria None /hpf Negative   Hyaline Cast /lpf 0-2     Vitamin D deficiency  Present on most recent labs   Latest Reference Range & Units 11/15/22 08:45   25-Hydroxy   Vitamin D 25 30 - 100 ng/mL 23 (L)         ROS: : see above      Current Outpatient Medications:     ondansetron (ZOFRAN ODT) 4 MG TABLET DISPERSIBLE, DISSOLVE 1 TABLET ON THE TONGUE EVERY 8 HOURS FOR UP TO 2 DAYS AS NEEDED FOR NAUSEA, Disp: , Rfl:     omeprazole (PRILOSEC) 20 MG  "delayed-release capsule, Take 1 Capsule by mouth every day., Disp: 30 Capsule, Rfl: 0    No Known Allergies    Past Medical History:   Diagnosis Date    Migraines     no aura     Past Surgical History:   Procedure Laterality Date    EYE LACERATION REPAIR  1/25/2009    Performed by LARON MALCOLM at SURGERY [facility] ORS     Family History   Problem Relation Age of Onset    Heart Disease Maternal Grandfather     Hypertension Maternal Grandfather     No Known Problems Mother     Heart Disease Paternal Grandmother         CABG    Heart Attack Paternal Grandfather     Alcohol abuse Paternal Grandfather     Diabetes Other      Social History     Socioeconomic History    Marital status: Single     Spouse name: Not on file    Number of children: Not on file    Years of education: Not on file    Highest education level: Not on file   Occupational History    Not on file   Tobacco Use    Smoking status: Never    Smokeless tobacco: Never   Vaping Use    Vaping Use: Never used   Substance and Sexual Activity    Alcohol use: Yes     Comment: occ    Drug use: No    Sexual activity: Not Currently   Other Topics Concern    Not on file   Social History Narrative    Not on file     Social Determinants of Health     Financial Resource Strain: Not on file   Food Insecurity: Not on file   Transportation Needs: Not on file   Physical Activity: Not on file   Stress: Not on file   Social Connections: Not on file   Intimate Partner Violence: Not on file   Housing Stability: Not on file       Objective:     Vitals: /68   Pulse 80   Temp 36.4 °C (97.6 °F)   Resp 16   Ht 1.575 m (5' 2\")   Wt 94.8 kg (209 lb)   SpO2 100%   BMI 38.23 kg/m²    General: Alert, pleasant, NAD  HEENT: Normocephalic.  Neck supple.   Respiratory: no distress, no audible wheezing, RR -WNL  Skin: Warm, dry, no rashes.  Extremities: No leg edema. No discoloration  Neurological: No tremors  Psych:  Affect/mood is normal, judgement is good, memory is intact, " grooming is appropriate.    Assessment/Plan:     Tami was seen today for follow-up.    Diagnoses and all orders for this visit:    Elevated CPK does not  New problem.  Not on statin medications, no strenuous exercise. ? Secondary to elevated BMI uncertain significance at this time. ?  ransient.  Further work-up indicated.  Possibly EMG and muscle biopsy.  I have requested the assistance of internal med for further evaluation and recommendations moving forward.  I have asked patient to schedule Dr. Montanez.  -     LDH; Future  -     ALDOLASE; Future  -     FERRITIN; Future  -     CK ISOENZYMES SERUM; Future  -     URIC ACID; Future    Myalgia  Chronic.  Initial set of labs unremarkable aside from having elevated CPK.  Unknown etiology at this time.  Further work-up indicated.  Consider EMG and muscle biopsy  -     LDH; Future  -     ALDOLASE; Future  -     FERRITIN; Future  -     CK ISOENZYMES SERUM; Future  -     URIC ACID; Future    Vitamin D deficiency  Recommend at least 2000 to 4000 IU daily of vitamin D3.    History of Raphael-Barr virus infection  Positive EBV IgG although value is trending up.  Uncertain significance at this time.    Hematuria, unspecified type  Present on recent labs however patient does recall being on her menstrual cycle at that time.  She is asymptomatic.      No follow-ups on file.          Laurence PORRAS.

## 2022-12-14 ENCOUNTER — OCCUPATIONAL MEDICINE (OUTPATIENT)
Dept: URGENT CARE | Facility: PHYSICIAN GROUP | Age: 22
End: 2022-12-14
Payer: COMMERCIAL

## 2022-12-14 VITALS
RESPIRATION RATE: 14 BRPM | SYSTOLIC BLOOD PRESSURE: 102 MMHG | WEIGHT: 209 LBS | TEMPERATURE: 98.8 F | HEART RATE: 89 BPM | HEIGHT: 62 IN | DIASTOLIC BLOOD PRESSURE: 68 MMHG | BODY MASS INDEX: 38.46 KG/M2 | OXYGEN SATURATION: 99 %

## 2022-12-14 DIAGNOSIS — S06.0X0A CONCUSSION WITHOUT LOSS OF CONSCIOUSNESS, INITIAL ENCOUNTER: ICD-10-CM

## 2022-12-14 DIAGNOSIS — S09.90XA CLOSED HEAD INJURY, INITIAL ENCOUNTER: ICD-10-CM

## 2022-12-14 DIAGNOSIS — Z02.1 PRE-EMPLOYMENT DRUG SCREENING: ICD-10-CM

## 2022-12-14 PROCEDURE — 80305 DRUG TEST PRSMV DIR OPT OBS: CPT | Performed by: PHYSICIAN ASSISTANT

## 2022-12-14 PROCEDURE — 99213 OFFICE O/P EST LOW 20 MIN: CPT | Performed by: PHYSICIAN ASSISTANT

## 2022-12-14 ASSESSMENT — FIBROSIS 4 INDEX: FIB4 SCORE: 0.5

## 2022-12-14 NOTE — LETTER
[facility]  [address] [place] [address]. [id number] - [place] [address]  Phone:  [phone] - Fax:  [phone]   [facility] Progress Report and Disability Certification  Date of Service: 12/14/2022   No Show:  No  Date / Time of Next Visit: 12/17/2022 9:15am   Claim Information   Patient Name: Tami Jimenez  Claim Number:     Employer:    Date of Injury: 12/14/2022     Insurer / TPA: Reg  ID / SSN:     Occupation: Production  Diagnosis: Diagnoses of Closed head injury, initial encounter, Concussion without loss of consciousness, initial encounter, and Pre-employment drug screening were pertinent to this visit.    Medical Information   Related to Industrial Injury? Yes    Subjective Complaints:  DOI: 12/24/2022     Tami Jimenez is a 22 y.o. female who presents today for head injury.  Patient states that she was at work today and was changing out [employer] wheel.  She says that as she was doing this she went to tell something to her coworker standing on her left did not realize that there were 2 metal bars right next to her head.  She hit one of the metal bars with her temple.  She did not lose consciousness.  She says that it hurt immediately and after about 10 minutes she started to notice some nausea, light sensitivit, headache, and intermittent dizziness.  She has not had any actual visual changes, no vomiting, no focal weakness.  She is not on blood thinners.  Denies any previous injuries to her head.     Objective Findings: Physical Exam  Constitutional:       Appearance: She is well-developed.   HENT:      Head: Normocephalic. Contusion present. No raccoon eyes, Land's sign, abrasion, right periorbital erythema, left periorbital erythema or laceration.         Comments: Left patient exhibits mild soft tissue swelling or tenderness.  No overlying erythema or ecchymosis.  Skin is intact.  No crepitus.     Right Ear: External ear normal.       Left Ear: Tympanic membrane, ear canal and external ear normal. No drainage.      Nose: Nose normal. No rhinorrhea.   Eyes:      Extraocular Movements: Extraocular movements intact.      Conjunctiva/sclera: Conjunctivae normal.      Pupils: Pupils are equal, round, and reactive to light.   Cardiovascular:      Rate and Rhythm: Normal rate and regular rhythm.      Heart sounds: Normal heart sounds. No murmur heard.  Pulmonary:      Effort: Pulmonary effort is normal.      Breath sounds: Normal breath sounds. No wheezing.   Musculoskeletal:      Cervical back: Normal range of motion.   Lymphadenopathy:      Cervical: No cervical adenopathy.   Skin:     General: Skin is warm and dry.      Capillary Refill: Capillary refill takes less than 2 seconds.   Neurological:      General: No focal deficit present.      Mental Status: She is alert and oriented to person, place, and time.      GCS: GCS eye subscore is 4. GCS verbal subscore is 5. GCS motor subscore is 6.      Cranial Nerves: Cranial nerves 2-12 are intact.      Motor: No tremor, seizure activity or pronator drift.      Coordination: Romberg sign negative. Coordination normal. Finger-Nose-Finger Test and Heel to Shin Test normal. Rapid alternating movements normal.      Gait: Gait is intact.      Deep Tendon Reflexes: Reflexes are normal and symmetric.   Psychiatric:         Behavior: Behavior normal.         Judgment: Judgment normal.      Pre-Existing Condition(s):     Assessment:   Initial Visit    Status: Additional Care Required  Permanent Disability:No    Plan:      Diagnostics:      Comments:       Disability Information   Status: Temporarily Totally Disabled    From:  12/14/2022  Through: 12/17/2022 Restrictions are: Temporary   Physical Restrictions   Sitting:    Standing:    Stooping:    Bending:      Squatting:    Walking:    Climbing:    Pushing:      Pulling:    Other:    Reaching Above Shoulder (L):   Reaching Above Shoulder (R):       Reaching  Below Shoulder (L):    Reaching Below Shoulder (R):      Not to exceed Weight Limits   Carrying(hrs):   Weight Limit(lb):   Lifting(hrs):   Weight  Limit(lb):     Comments: Given patient's concussion symptoms right now do not want her driving or operating machinery.  She will be TTD for the next 48 to 72 hours.  Return on Saturday for recheck.    Repetitive Actions   Hands: i.e. Fine Manipulations from Grasping:     Feet: i.e. Operating Foot Controls:     Driving / Operate Machinery:     Health Care Provider’s Original or Electronic Signature  Stella Ocampo P.A.-C. Health Care Provider’s Original or Electronic Signature    Ivan Birmingham DO MPH     Clinic Name / Location: [address]  [place] [address] Clinic Phone Number: Dept: [phone]   Appointment Time: 4:50 Pm Visit Start Time: 7:25 PM   Check-In Time:  5:16 Pm Visit Discharge Time:  8:04PM   Original-Treating Physician or Chiropractor    Page 2-Insurer/TPA    Page 3-Employer    Page 4-Employee

## 2022-12-14 NOTE — LETTER
"EMPLOYEE’S CLAIM FOR COMPENSATION/ REPORT OF INITIAL TREATMENT  FORM C-4    EMPLOYEE’S CLAIM - PROVIDE ALL INFORMATION REQUESTED   First Name  Tami Last Name  Tony Birthdate                    2000                Sex  female Claim Number (Insurer’s Use Only)    Home Address  [address] Age  22 y.o. Height  1.575 m (5' 2\") Weight  94.8 kg (209 lb) [facility]     [facility] Zip  [address] Telephone  [phone] (home)    Mailing Address  [address] [facility] [place]  [address] Primary Language Spoken  English    Insurer   Third-Party   Reg   Employee's Occupation (Job Title) When Injury or Occupational Disease Occurred  Production    Employer's Name/Company Name    [employer]    Telephone  [phone]    Office Mail Address (Number and Street)   [address]  [place]  [address]    Date of Injury  12/14/2022               Hours Injury  2:45 PM Date Employer Notified  12/14/2022 Last Day of Work after Injury     or Occupational Disease  12/14/2022 Supervisor to Whom Injury     Reported  Josh Justice   Address or Location of Accident (if applicable)  Work [1]   What were you doing at the time of accident? (if applicable)  Changing Film on plusser    How did this injury or occupational disease occur? (Be specific an answer in detail. Use additional sheet if necessary)  I was emptying the scrap reel.I was bent down in front of it tying off the end of the scrap to the wheel and turned my head to say something to a coworker when i hit the metal bar on the left side of my head on the temple following the hit I got nausaus light headed th then had a headache   If you believe that you have an occupational disease, when did you first have knowledge of the disability and it relationship to your employment?  N/A Witnesses to the Accident  Ira Hunt      Nature of " Injury or Occupational Disease  Foreign Body  Part(s) of Body Injured or Affected  Skull, N/A, N/A    I certify that the above is true and correct to the best of my knowledge and that I have provided this information in order to obtain the benefits of [place]  I hereby authorize any physician, chiropractor, surgeon, practitioner, or other person, any hospital, including [facility] or [facility], any medical service organization, any insurance company, or other institution or organization to release to each other, any medical or other information, including benefits paid or payable, pertinent to this injury or disease, except information relative to diagnosis, treatment and/or counseling for AIDS, psychological conditions, alcohol or controlled substances, for which I must give specific authorization.  A Photostat of this authorization shall be as valid as the original.     Date 12/14/2022   [facility] Employee’s Original or  *Electronic Signature   THIS REPORT MUST BE COMPLETED AND MAILED WITHIN 3 WORKING DAYS OF TREATMENT   Place  [facility]  Name of Facility  [place]   Date  12/14/2022 Diagnosis and Description of Injury or Occupational Disease  (S09.90XA) Closed head injury, initial encounter  (S06.0X0A) Concussion without loss of consciousness, initial encounter  (Z02.1) Pre-employment drug screening Is there evidence the injured employee was under the influence of alcohol and/or another controlled substance at the time of accident?  ? No ? Yes (if yes, please explain)    Hour  7:25 PM   Diagnoses of Closed head injury, initial encounter, Concussion without loss of consciousness, initial encounter, and Pre-employment drug screening were pertinent to this visit. No   Treatment  Given patient's concussion symptoms right now do not want her driving or operating  machinery.  She will be TTD for the next 48 to 72 hours.  Return on Saturday for recheck.  She should apply ice to her left temple multiple times per day for 15 to 20 minutes at a time.  Avoid OTC NSAIDs for the first 24 hours.  May use OTC acetaminophen.  Avoid any activities that exacerbate her symptoms.    Have you advised the patient to remain off work five days or     more?    X-Ray Findings      ? Yes Indicate dates:   From   To      From information given by the employee, together with medical evidence, can        you directly connect this injury or occupational disease as job incurred?  Yes ? No If no, is the injured employee capable of:  ? full duty  No ? modified duty  No   Is additional medical care by a physician indicated?  Yes If Modified Duty, Specify any Limitations / Restrictions      Do you know of any previous injury or disease contributing to this condition or occupational disease?  ? Yes ? No (Explain if yes)                          No   Date  12/14/2022 Print Health Care Provider's   Doretha Ocampo P.A.-C. I certify the employer’s copy of  this form was mailed on:   Address  [address] [place] [address]. [address] Insurer’s Use Only     [facility] Zip  [address]    Provider’s Tax ID Number  [id number] Telephone  Dept: [phone]             Health Care Provider’s Original or Electronic Signature  e-DORETHA Hernandez P.A.-C. Degree (MD,DO, DC,PAAvelC,APRN)   PA-C      * Complete and attach Release of Information (Form C-4A) when injured employee signs C-4 Form electronically  ORIGINAL - TREATING HEALTHCARE PROVIDER PAGE 2 - INSURER/TPA PAGE 3 - EMPLOYER PAGE 4 - EMPLOYEE             Form C-4 (rev.08/21)           BRIEF DESCRIPTION OF RIGHTS AND BENEFITS  (Pursuant to [place] 616C.050)    Notice of Injury or Occupational Disease (Incident Report Form C-1): If an injury or occupational disease (OD) arises out of and in the course of employment, you must provide written notice to your employer as  "soon as practicable, but no later than 7 days after the accident or OD. Your employer shall maintain a sufficient supply of the required forms.    Claim for Compensation (Form C-4): If medical treatment is sought, the form C-4 is available at the place of initial treatment. A completed \"Claim for Compensation\" (Form C-4) must be filed within 90 days after an accident or OD. The treating physician or chiropractor must, within 3 working days after treatment, complete and mail to the employer, the employer's insurer and third-party , the Claim for Compensation.    Medical Treatment: If you require medical treatment for your on-the-job injury or OD, you may be required to select a physician or chiropractor from a list provided by your workers’ compensation insurer, if it has contracted with an Organization for Managed Care (MCO) or Preferred Provider Organization (PPO) or providers of health care. If your employer has not entered into a contract with an MCO or PPO, you may select a physician or chiropractor from the Panel of Physicians and Chiropractors. Any medical costs related to your industrial injury or OD will be paid by your insurer.    Temporary Total Disability (TTD): If your doctor has certified that you are unable to work for a period of at least 5 consecutive days, or 5 cumulative days in a 20-day period, or places restrictions on you that your employer does not accommodate, you may be entitled to TTD compensation.    Temporary Partial Disability (TPD): If the wage you receive upon reemployment is less than the compensation for TTD to which you are entitled, the insurer may be required to pay you TPD compensation to make up the difference. TPD can only be paid for a maximum of 24 months.    Permanent Partial Disability (PPD): When your medical condition is stable and there is an indication of a PPD as a result of your injury or OD, within 30 days, your insurer must arrange for an evaluation by a " rating physician or chiropractor to determine the degree of your PPD. The amount of your PPD award depends on the date of injury, the results of the PPD evaluation, your age and wage.    Permanent Total Disability (PTD): If you are medically certified by a treating physician or chiropractor as permanently and totally disabled and have been granted a PTD status by your insurer, you are entitled to receive monthly benefits not to exceed 66 2/3% of your average monthly wage. The amount of your PTD payments is subject to reduction if you previously received a lump-sum PPD award.    Vocational Rehabilitation Services: You may be eligible for vocational rehabilitation services if you are unable to return to the job due to a permanent physical impairment or permanent restrictions as a result of your injury or occupational disease.    Transportation and Per Denise Reimbursement: You may be eligible for travel expenses and per denise associated with medical treatment.    Reopening: You may be able to reopen your claim if your condition worsens after claim closure.     Appeal Process: If you disagree with a written determination issued by the insurer or the insurer does not respond to your request, you may appeal to the Department of Administration, , by following the instructions contained in your determination letter. You must appeal the determination within 70 days from the date of the determination letter at [address], [address], [place], [place] [address], or [address] [facility] [address], [place] [address]. If you disagree with the  decision, you may appeal to the Department of Administration, . You must file your appeal within 30 days from the date of the  decision letter at [address], [address], [place], [place] [address], or [address] [facility] [address], [place] [address]. If you disagree with a decision of an Appeals  Officer, you may file a petition for judicial review with the District Court. You must do so within 30 days of the Appeal Officer’s decision. You may be represented by an  at your own expense or you may contact the [facility] for possible representation.    [place]  for Injured Workers (NAIW): If you disagree with a  decision, you may request that NAIW represent you without charge at an  Hearing. For information regarding denial of benefits, you may contact the [facility] at: [address], [address], [place] [address], [phone], or [address] [facility], [address], [place] [address], [phone]    To File a Complaint with the Division: If you wish to file a complaint with the  of the Division of Industrial Relations (DIR),  please contact the Workers’ Compensation Section, [address] [facility] [facility] [address], [place], [place] [address], telephone ([phone]) [phone], or [address], [address], [place], [place] [address], telephone [phone].    For assistance with Workers’ Compensation Issues: You may contact the [facility] Office for Consumer Health Assistance, [address] [facility] [address], [place], [place] [address], Toll Free 1-308.360.3784, Web site: http://[facility].[place].gov/Programs/HOWARD E-mail: howard@[facility].nv.[facility]              __________________________________________________________________                                    _________________            Employee Name / Signature                                                                                                                            Date                                                                                                                                                                                                                              D-2 (rev. 10/20)

## 2022-12-15 NOTE — PROGRESS NOTES
"Subjective     Tami Jimenez is a 22 y.o. female who presents with Head Injury (X 1 day, left side of head, nausea, headache, sensitive to light, dizziness)        DOI: 12/24/2022     Tami Jimenez is a 22 y.o. female who presents today for head injury.  Patient states that she was at work today and was changing out The scrap wheel.  She says that as she was doing this she went to tell something to her coworker standing on her left did not realize that there were 2 metal bars right next to her head.  She hit one of the metal bars with her temple.  She did not lose consciousness.  She says that it hurt immediately and after about 10 minutes she started to notice some nausea, light sensitivit, headache, and intermittent dizziness.  She has not had any actual visual changes, no vomiting, no focal weakness.  She is not on blood thinners.  Denies any previous injuries to her head.        Review of Systems   Constitutional:  Negative for chills and fever.   Eyes:  Positive for photophobia. Negative for blurred vision and double vision.   Gastrointestinal:  Positive for nausea. Negative for vomiting.   Neurological:  Positive for dizziness and headaches. Negative for focal weakness and loss of consciousness.   Endo/Heme/Allergies:  Does not bruise/bleed easily.         PMH: No pertinent past medical history to this problem  MEDS: Medications were reviewed in Epic  ALLERGIES: Allergies were reviewed in Epic  SOCHX: Works in production at [employer]  FH: No pertinent family history to this problem     Objective     /68   Pulse 89   Temp 37.1 °C (98.8 °F) (Temporal)   Resp 14   Ht 1.575 m (5' 2\")   Wt 94.8 kg (209 lb)   SpO2 99%   BMI 38.23 kg/m²      Physical Exam  Constitutional:       Appearance: She is well-developed.   HENT:      Head: Normocephalic. Contusion present. No raccoon eyes, Land's sign, abrasion, right periorbital erythema, left periorbital erythema or laceration.      "   Comments: Left patient exhibits mild soft tissue swelling or tenderness.  No overlying erythema or ecchymosis.  Skin is intact.  No crepitus.     Right Ear: External ear normal.      Left Ear: Tympanic membrane, ear canal and external ear normal. No drainage.      Nose: Nose normal. No rhinorrhea.   Eyes:      Extraocular Movements: Extraocular movements intact.      Conjunctiva/sclera: Conjunctivae normal.      Pupils: Pupils are equal, round, and reactive to light.   Cardiovascular:      Rate and Rhythm: Normal rate and regular rhythm.      Heart sounds: Normal heart sounds. No murmur heard.  Pulmonary:      Effort: Pulmonary effort is normal.      Breath sounds: Normal breath sounds. No wheezing.   Musculoskeletal:      Cervical back: Normal range of motion.   Lymphadenopathy:      Cervical: No cervical adenopathy.   Skin:     General: Skin is warm and dry.      Capillary Refill: Capillary refill takes less than 2 seconds.   Neurological:      General: No focal deficit present.      Mental Status: She is alert and oriented to person, place, and time.      GCS: GCS eye subscore is 4. GCS verbal subscore is 5. GCS motor subscore is 6.      Cranial Nerves: Cranial nerves 2-12 are intact.      Motor: No tremor, seizure activity or pronator drift.      Coordination: Romberg sign negative. Coordination normal. Finger-Nose-Finger Test and Heel to Shin Test normal. Rapid alternating movements normal.      Gait: Gait is intact.      Deep Tendon Reflexes: Reflexes are normal and symmetric.   Psychiatric:         Behavior: Behavior normal.         Judgment: Judgment normal.         Assessment & Plan     1. Closed head injury, initial encounter    2. Concussion without loss of consciousness, initial encounter    3. Pre-employment drug screening  - POCT 6 Panel Urine Drug Screen       Given patient's concussion symptoms right now do not want her driving or operating machinery.  She will be TTD for the next 48 to 72 hours.   Return on Saturday for recheck.  She should apply ice to her left temple multiple times per day for 15 to 20 minutes at a time.  Avoid OTC NSAIDs for the first 24 hours.  May use OTC acetaminophen.  Avoid any activities that exacerbate her symptoms. ER precautions discussed

## 2022-12-17 ENCOUNTER — OCCUPATIONAL MEDICINE (OUTPATIENT)
Dept: URGENT CARE | Facility: PHYSICIAN GROUP | Age: 22
End: 2022-12-17
Payer: COMMERCIAL

## 2022-12-17 VITALS
TEMPERATURE: 98 F | OXYGEN SATURATION: 97 % | WEIGHT: 207 LBS | SYSTOLIC BLOOD PRESSURE: 100 MMHG | HEIGHT: 62 IN | HEART RATE: 69 BPM | BODY MASS INDEX: 38.09 KG/M2 | DIASTOLIC BLOOD PRESSURE: 68 MMHG

## 2022-12-17 DIAGNOSIS — S09.90XD CLOSED HEAD INJURY, SUBSEQUENT ENCOUNTER: ICD-10-CM

## 2022-12-17 PROCEDURE — 99213 OFFICE O/P EST LOW 20 MIN: CPT | Performed by: NURSE PRACTITIONER

## 2022-12-17 ASSESSMENT — ENCOUNTER SYMPTOMS
HEADACHES: 1
NAUSEA: 0
NECK PAIN: 0
DOUBLE VISION: 0
BLURRED VISION: 0
DIZZINESS: 0
VOMITING: 0

## 2022-12-17 ASSESSMENT — FIBROSIS 4 INDEX: FIB4 SCORE: 0.5

## 2022-12-17 NOTE — PROGRESS NOTES
"Marifer Jimenez is a 22 y.o. female who presents with Head Injury (Work related injury (L) temple area Sx x Wednesday ) and Headache (Sx x Thursday )            DOI: 12/24/2022. Patient reports improvement in symptoms. Area to left head is . Denies dizziness, nausea, vomiting, or vision changes. Headache now resolved. Reports having a mild headache yesterday. Denies current headache. No neck pain. Has not taken any medications.       Head Injury   Associated symptoms include headaches. Pertinent negatives include no blurred vision or vomiting.   Headache    Review of Systems   Eyes:  Negative for blurred vision and double vision.   Gastrointestinal:  Negative for nausea and vomiting.   Musculoskeletal:  Negative for neck pain.   Neurological:  Positive for headaches. Negative for dizziness.   All other systems reviewed and are negative.           Objective     /68 (BP Location: Left arm, Patient Position: Sitting, BP Cuff Size: Adult)   Pulse 69   Temp 36.7 °C (98 °F) (Temporal)   Ht 1.575 m (5' 2\")   Wt 93.9 kg (207 lb)   SpO2 97%   BMI 37.86 kg/m²      Physical Exam  Vitals reviewed.   Constitutional:       General: She is not in acute distress.     Appearance: She is not toxic-appearing.   HENT:      Head: Normocephalic. No abrasion or contusion.      Comments: Mild tenderness to palpation of left head, no swelling or bruising.   Eyes:      Extraocular Movements: Extraocular movements intact.      Conjunctiva/sclera: Conjunctivae normal.      Pupils: Pupils are equal, round, and reactive to light.   Pulmonary:      Effort: Pulmonary effort is normal. No respiratory distress.   Musculoskeletal:      Cervical back: Normal range of motion.   Skin:     General: Skin is warm and dry.      Findings: No bruising or erythema.   Neurological:      General: No focal deficit present.      Mental Status: She is alert and oriented to person, place, and time.   Psychiatric:       "   Behavior: Behavior normal.                           Assessment & Plan     1. Closed head injury, subsequent encounter  -Ice  -OTC pain medication (tylenol or ibuprofen) as needed.    Follow up for persistent or severe headache, vision changes, dizziness, nausea, vomiting, or any other concerns.    No S&S of concussion. Mild tenderness to site. Off work until Monday. Anticipate full resolution, and no further complications. Discharged MMI.

## 2022-12-17 NOTE — LETTER
[facility]  [address] [place] [address]. [address] - [place] [address]  Phone:  [phone] - Fax:  [phone]   [facility] Progress Report and Disability Certification  Date of Service: 12/17/2022   No Show:  No  Date / Time of Next Visit: 12/17/2022   Claim Information   Patient Name: Tami Jimenez  Claim Number:     Employer:   [employer]  Date of Injury: 12/14/2022     Insurer / TPA: Reg  ID / SSN:     Occupation: Production  Diagnosis: The encounter diagnosis was Closed head injury, subsequent encounter.    Medical Information   Related to Industrial Injury? Yes    Subjective Complaints:  DOI: 12/24/2022. Patient reports improvement in symptoms. Area to left head is . Denies dizziness, nausea, vomiting, or vision changes. Headache now resolved. Reports having a mild headache yesterday. Denies current headache. No neck pain. Has not taken any medications.   Objective Findings: Physical Exam  Vitals reviewed.   Constitutional:       General: She is not in acute distress.     Appearance: She is not toxic-appearing.   HENT:      Head: Normocephalic. No abrasion or contusion.      Comments: Mild tenderness to palpation of left head, no swelling or bruising.   Eyes:      Extraocular Movements: Extraocular movements intact.      Conjunctiva/sclera: Conjunctivae normal.      Pupils: Pupils are equal, round, and reactive to light.   Pulmonary:      Effort: Pulmonary effort is normal. No respiratory distress.   Musculoskeletal:      Cervical back: Normal range of motion.   Skin:     General: Skin is warm and dry.      Findings: No bruising or erythema.   Neurological:      General: No focal deficit present.      Mental Status: She is alert and oriented to person, place, and time.   Psychiatric:         Behavior: Behavior normal.      Pre-Existing Condition(s): None reported.   Assessment:   Condition Improved    Status: Discharged /  MMI   Permanent Disability:No    Plan: Medication    Diagnostics:      Comments:       Disability Information   Status: Released to Full Duty    From:  12/17/2022  Through: 12/17/2022 Restrictions are:     Physical Restrictions   Sitting:    Standing:    Stooping:    Bending:      Squatting:    Walking:    Climbing:    Pushing:      Pulling:    Other:    Reaching Above Shoulder (L):   Reaching Above Shoulder (R):       Reaching Below Shoulder (L):    Reaching Below Shoulder (R):      Not to exceed Weight Limits   Carrying(hrs):   Weight Limit(lb):   Lifting(hrs):   Weight  Limit(lb):     Comments: -Ice  -OTC pain medication (tylenol or ibuprofen) as needed.    Follow up for persistent or severe headache, vision changes, dizziness, nausea, vomiting, or any other concerns.    Repetitive Actions   Hands: i.e. Fine Manipulations from Grasping:     Feet: i.e. Operating Foot Controls:     Driving / Operate Machinery:     Health Care Provider’s Original or Electronic Signature  RODRIGO Fraser Health Care Provider’s Original or Electronic Signature    Ivan Bimringham DO MPH     Clinic Name / Location: [address]  [place] [address] Clinic Phone Number: Dept: [phone]   Appointment Time: 9:15 Am Visit Start Time: 10:01 AM   Check-In Time:  9:19 Am Visit Discharge Time:  10:47AM   Original-Treating Physician or Chiropractor    Page 2-Insurer/TPA    Page 3-Employer    Page 4-Employee

## 2023-01-06 ENCOUNTER — OFFICE VISIT (OUTPATIENT)
Dept: MEDICAL GROUP | Facility: MEDICAL CENTER | Age: 23
End: 2023-01-06
Payer: COMMERCIAL

## 2023-01-06 VITALS
BODY MASS INDEX: 37.73 KG/M2 | OXYGEN SATURATION: 98 % | HEIGHT: 62 IN | DIASTOLIC BLOOD PRESSURE: 82 MMHG | SYSTOLIC BLOOD PRESSURE: 114 MMHG | HEART RATE: 98 BPM | WEIGHT: 205 LBS | TEMPERATURE: 98.3 F
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
## 2023-01-06 DIAGNOSIS — R10.9 FLANK PAIN: ICD-10-CM

## 2023-01-06 PROCEDURE — 99213 OFFICE O/P EST LOW 20 MIN: CPT | Performed by: NURSE PRACTITIONER

## 2023-01-06 ASSESSMENT — PATIENT HEALTH QUESTIONNAIRE - PHQ9: CLINICAL INTERPRETATION OF PHQ2 SCORE: 0

## 2023-01-06 ASSESSMENT — FIBROSIS 4 INDEX: FIB4 SCORE: 0.5

## 2023-01-06 NOTE — LETTER
January 6, 2023        Tami Jimenez  [address] [place] [address]          Please excuse Ms. Tami Jimenez from work on January 5th and January 6th, 2023. She was seen in the clinic today on January 6th, 2023. May return to work January 9th, 2023.       If you have any questions or concerns, please don't hesitate to call.        Sincerely,        CHIQUITA Morrow.    Electronically Signed

## 2023-01-06 NOTE — PROGRESS NOTES
Chief Complaint   Patient presents with    Fall     On Ice Hip Pain L side DOI: 1/4/2023       Subjective:     HPI:     Tami Jimenez is a 22 y.o. female   here to discuss the evaluation and management of:     Fell on the ice on Wednesday fell against her truck on her right flank area.  No bruising, redness or laceration noted.  Pain is slowly improving.  Using heating pad.  Needs a note for work as she left work early yesterday and did not go into work today due to discomfort.  She did not hit her head      ROS: : see above      Current Outpatient Medications:     ondansetron (ZOFRAN ODT) 4 MG TABLET DISPERSIBLE, DISSOLVE 1 TABLET ON THE TONGUE EVERY 8 HOURS FOR UP TO 2 DAYS AS NEEDED FOR NAUSEA, Disp: , Rfl:     omeprazole (PRILOSEC) 20 MG delayed-release capsule, Take 1 Capsule by mouth every day., Disp: 30 Capsule, Rfl: 0    No Known Allergies    Past Medical History:   Diagnosis Date    Migraines     no aura     Past Surgical History:   Procedure Laterality Date    EYE LACERATION REPAIR  1/25/2009    Performed by LARON MALCOLM at SURGERY [facility]     Family History   Problem Relation Age of Onset    Heart Disease Maternal Grandfather     Hypertension Maternal Grandfather     No Known Problems Mother     Heart Disease Paternal Grandmother         CABG    Heart Attack Paternal Grandfather     Alcohol abuse Paternal Grandfather     Diabetes Other      Social History     Socioeconomic History    Marital status: Single     Spouse name: Not on file    Number of children: Not on file    Years of education: Not on file    Highest education level: Not on file   Occupational History    Not on file   Tobacco Use    Smoking status: Never    Smokeless tobacco: Never   Vaping Use    Vaping Use: Never used   Substance and Sexual Activity    Alcohol use: Not Currently     Comment: occ    Drug use: No    Sexual activity: Not Currently   Other Topics Concern    Not on file   Social History Narrative    Not on  "file     Social Determinants of Health     Financial Resource Strain: Not on file   Food Insecurity: Not on file   Transportation Needs: Not on file   Physical Activity: Not on file   Stress: Not on file   Social Connections: Not on file   Intimate Partner Violence: Not on file   Housing Stability: Not on file       Objective:     Vitals: /82 (BP Location: Right arm, Patient Position: Sitting, BP Cuff Size: Adult)   Pulse 98   Temp 36.8 °C (98.3 °F) (Temporal)   Ht 1.575 m (5' 2\")   Wt 93 kg (205 lb)   SpO2 98%   BMI 37.49 kg/m²    General: Alert, pleasant, NAD  HEENT: Normocephalic.  Neck supple.   Respiratory: no distress, no audible wheezing, RR -WNL  Skin: Warm, dry, no rashes.  Extremities: No leg edema. No discoloration  Neurological: No tremors  Psych:  Affect/mood is normal, judgement is good, memory is intact, grooming is appropriate.    Assessment/Plan:     Tami was seen today for fall.    Diagnoses and all orders for this visit:    Fall due to slipping on ice or snow, initial encounter  Flank pain  Acute injury, self-limiting.  Physical exam essentially benign aside from some mild tenderness with palpation.  Recommend ice, ibuprofen or Tylenol, Epson salt soak and follow-up if symptoms persist or worsen.  Red flags discussed.  Note provided for work.      Declines flu vaccine today    No follow-ups on file.      Laurence PORRAS.  "

## 2023-01-12 ENCOUNTER — HOSPITAL ENCOUNTER (OUTPATIENT)
Dept: LAB | Facility: MEDICAL CENTER | Age: 23
End: 2023-01-12
Attending: NURSE PRACTITIONER
Payer: COMMERCIAL

## 2023-01-12 DIAGNOSIS — M79.10 MYALGIA: ICD-10-CM

## 2023-01-12 DIAGNOSIS — R74.8 ELEVATED CPK: ICD-10-CM

## 2023-01-12 LAB
FERRITIN SERPL-MCNC: 75.3 NG/ML (ref 10–291)
LDH SERPL L TO P-CCNC: 261 U/L (ref 107–266)
URATE SERPL-MCNC: 4.8 MG/DL (ref 1.9–8.2)

## 2023-01-12 PROCEDURE — 82085 ASSAY OF ALDOLASE: CPT

## 2023-01-12 PROCEDURE — 82552 ASSAY OF CPK IN BLOOD: CPT

## 2023-01-12 PROCEDURE — 36415 COLL VENOUS BLD VENIPUNCTURE: CPT

## 2023-01-12 PROCEDURE — 83615 LACTATE (LD) (LDH) ENZYME: CPT

## 2023-01-12 PROCEDURE — 82550 ASSAY OF CK (CPK): CPT

## 2023-01-12 PROCEDURE — 82728 ASSAY OF FERRITIN: CPT

## 2023-01-12 PROCEDURE — 84550 ASSAY OF BLOOD/URIC ACID: CPT

## 2023-01-13 LAB — ALDOLASE SERPL-CCNC: 4 U/L (ref 1.2–7.6)

## 2023-01-15 LAB
CK BB CFR SERPL ELPH: 0 % (ref 0–0)
CK MACRO1 CFR SERPL: 0 % (ref 0–0)
CK MACRO2 CFR SERPL: 0 % (ref 0–0)
CK MB CFR SERPL ELPH: 0 % (ref 0–4)
CK MM CFR SERPL ELPH: 100 % (ref 96–100)
CK SERPL-CCNC: 202 U/L (ref 26–192)

## 2023-02-07 ENCOUNTER — OFFICE VISIT (OUTPATIENT)
Dept: MEDICAL GROUP | Facility: MEDICAL CENTER | Age: 23
End: 2023-02-07
Payer: COMMERCIAL

## 2023-02-07 VITALS
HEART RATE: 91 BPM | TEMPERATURE: 97.5 F | DIASTOLIC BLOOD PRESSURE: 72 MMHG | WEIGHT: 205 LBS | OXYGEN SATURATION: 96 % | BODY MASS INDEX: 37.73 KG/M2 | HEIGHT: 62 IN | SYSTOLIC BLOOD PRESSURE: 108 MMHG

## 2023-02-07 DIAGNOSIS — R53.83 OTHER FATIGUE: ICD-10-CM

## 2023-02-07 DIAGNOSIS — Z86.19 HISTORY OF EPSTEIN-BARR VIRUS INFECTION: ICD-10-CM

## 2023-02-07 PROCEDURE — 99214 OFFICE O/P EST MOD 30 MIN: CPT | Performed by: STUDENT IN AN ORGANIZED HEALTH CARE EDUCATION/TRAINING PROGRAM

## 2023-02-07 ASSESSMENT — ENCOUNTER SYMPTOMS
WEIGHT LOSS: 0
NAUSEA: 0
BLOOD IN STOOL: 0
COUGH: 0
CONSTIPATION: 0
FEVER: 0
VOMITING: 0
DIARRHEA: 0
ABDOMINAL PAIN: 0
PALPITATIONS: 0
CHILLS: 0
HEMOPTYSIS: 0

## 2023-02-07 ASSESSMENT — FIBROSIS 4 INDEX: FIB4 SCORE: 0.5

## 2023-02-07 NOTE — LETTER
February 7, 2023        Tami Barraza    I made some recommendations, I have some labs ordered, orders will available at any [facility]. You may consider getting them or coming back in for discussion with me on specifics of each testing.    Thank you  Dr. Tarik Montanez M.D.

## 2023-02-07 NOTE — PROGRESS NOTES
"Subjective:     CC: chronic fatigue and bone pain     HPI:   Tami presents today with    Problem   Fatigue    General fatigue.   See Hx of EBV     History of Raphael-Barr Virus Infection    - Hx of EBV infection in 02/2019  - Since the infection, she endorses she continues to have bone pain and myalgia primarily on bilateral proximal extremities. She denies arthralgias, dry eyes, dry mouth, joint swelling, joint pain, fevers, fhx of autoimmune disease.     Her symptoms fluctuates on day to day basis, on some day she has chronic pain. She reports 4-5/ days out of week she has flares that would affect her work at [employer].     She reports during flares, she has pain on her hands.   - RF, CHATA, ESR were fine   - CBC is fine, CMP is fine, LFT is fine  - TSH is fine  - cortisol is fine               Denies dry eyes, dry mouth, joint swelling, joint pain  No maternal Fhx of autoimmune, fevers    Health Maintenance: follow with PCP    ROS:  Review of Systems   Constitutional:  Negative for chills, fever and weight loss.   Respiratory:  Negative for cough and hemoptysis.    Cardiovascular:  Negative for chest pain and palpitations.   Gastrointestinal:  Negative for abdominal pain, blood in stool, constipation, diarrhea, melena, nausea and vomiting.     Objective:     Exam:  /72 (BP Location: Right arm, Patient Position: Sitting, BP Cuff Size: Adult)   Pulse 91   Temp 36.4 °C (97.5 °F) (Temporal)   Ht 1.575 m (5' 2\")   Wt 93 kg (205 lb) Comment: pt reported  SpO2 96%   BMI 37.49 kg/m²  Body mass index is 37.49 kg/m².    Physical Exam  Constitutional:       Appearance: Normal appearance.   Cardiovascular:      Rate and Rhythm: Normal rate and regular rhythm.   Pulmonary:      Effort: Pulmonary effort is normal.      Breath sounds: Normal breath sounds.   Musculoskeletal:      Cervical back: Normal range of motion and neck supple.   Lymphadenopathy:      Cervical: No cervical adenopathy.   Neurological:      " Mental Status: She is alert.           Labs:   Hx of EBV in 2019  Recent CBC wittout evidencce of lymphocytosis, thrombocytopenia, reccent CMP without abnormal LFT        Assessment & Plan:     22 y.o. female with the following -     1. History of Raphael-Barr virus infection  2. Fatigue  Chronic fatigue, muscle sensitivity x 2-3 years since initial EBV infection in 2019. Uncontrolled.   Signs and symptoms suspicious for myalgic encephalopmyelitis/ chronic fatigue syndrome.   Patient mainly has myalgia and fatigue.   She has had extensive blood test by primary including RF, CHATA, ESR were fine. Cortisol level were fine. TSH was fine. CBC without lymphocytosis or thrombocytopenia, LFT was fine.   - There is transient increase in EBV VCA Igg however, however, this is of unclear significance. No associated fever, lymphadenopathy, liver function abnormality of cytopenia.     Plan  - May consider analgesic such as acetaminophen, SNRI  - Continue daily exercise  - Consider psychology referral for CBT in setting of chronic pain  - Will check vitamin D and HIV screening  - May consider EBV IgM, Igg and EBNA abs   - May consider sleep study, however does not currently complain of sleep issue  - Continue routine CBC to monitor and physical for evaluation for LAD          Return in about 3 months (around 5/7/2023) for Lab review.    Please note that this dictation was created using voice recognition software. I have made every reasonable attempt to correct obvious errors, but I expect that there are errors of grammar and possibly content that I did not discover before finalizing the note.

## 2023-02-17 ENCOUNTER — HOSPITAL ENCOUNTER (OUTPATIENT)
Dept: LAB | Facility: MEDICAL CENTER | Age: 23
End: 2023-02-17
Attending: STUDENT IN AN ORGANIZED HEALTH CARE EDUCATION/TRAINING PROGRAM
Payer: COMMERCIAL

## 2023-02-17 LAB
25(OH)D3 SERPL-MCNC: 24 NG/ML (ref 30–100)
HIV 1+2 AB+HIV1 P24 AG SERPL QL IA: NORMAL

## 2023-02-17 PROCEDURE — 36415 COLL VENOUS BLD VENIPUNCTURE: CPT

## 2023-02-17 PROCEDURE — 87389 HIV-1 AG W/HIV-1&-2 AB AG IA: CPT

## 2023-02-17 PROCEDURE — 86665 EPSTEIN-BARR CAPSID VCA: CPT

## 2023-02-17 PROCEDURE — 82306 VITAMIN D 25 HYDROXY: CPT

## 2023-02-18 LAB
EBV VCA IGG SER IA-ACNC: >750 U/ML (ref 0–21.9)
EBV VCA IGM SER IA-ACNC: <10 U/ML (ref 0–43.9)

## 2023-02-21 ENCOUNTER — OFFICE VISIT (OUTPATIENT)
Dept: MEDICAL GROUP | Facility: MEDICAL CENTER | Age: 23
End: 2023-02-21
Payer: COMMERCIAL

## 2023-02-21 VITALS
HEART RATE: 89 BPM | OXYGEN SATURATION: 98 % | SYSTOLIC BLOOD PRESSURE: 126 MMHG | WEIGHT: 198 LBS | DIASTOLIC BLOOD PRESSURE: 68 MMHG | HEIGHT: 62 IN | TEMPERATURE: 98.9 F | BODY MASS INDEX: 36.44 KG/M2

## 2023-02-21 DIAGNOSIS — Z30.011 ENCOUNTER FOR INITIAL PRESCRIPTION OF CONTRACEPTIVE PILLS: ICD-10-CM

## 2023-02-21 DIAGNOSIS — E66.9 OBESITY (BMI 35.0-39.9 WITHOUT COMORBIDITY): ICD-10-CM

## 2023-02-21 DIAGNOSIS — Z30.09 ENCOUNTER FOR COUNSELING REGARDING CONTRACEPTION: ICD-10-CM

## 2023-02-21 PROCEDURE — 99213 OFFICE O/P EST LOW 20 MIN: CPT | Performed by: NURSE PRACTITIONER

## 2023-02-21 RX ORDER — NORETHINDRONE ACETATE AND ETHINYL ESTRADIOL AND FERROUS FUMARATE 1MG-20(24)
1 KIT ORAL DAILY
Qty: 28 TABLET | Refills: 3 | Status: SHIPPED | OUTPATIENT
Start: 2023-02-21 | End: 2023-02-23

## 2023-02-21 ASSESSMENT — FIBROSIS 4 INDEX: FIB4 SCORE: 0.5

## 2023-02-21 NOTE — PROGRESS NOTES
"Chief Complaint   Patient presents with    Contraception       Subjective:     HPI:     Tami Jimenez is a 22 y.o. female here to for discussion regarding contraception     Would like to get on a birth control.   Last time was in high school was on oral DMITRIY for acne-had missed a few days. Currently not sexually active but would like to be prepared.     Denies any personal or family history of blood clots, breast cancer, uterine cancer or ovarian cancer. Does not use tobacco products. Denies migraine with aura. Denies high blood pressure, history of cardiovascular disease, or stroke.    ROS: : see above        Current Outpatient Medications:     Norethin Ace-Eth Estrad-FE 1-20 MG-MCG(24) Tab, Take 1 Tablet by mouth every day., Disp: 28 Tablet, Rfl: 3    ondansetron (ZOFRAN ODT) 4 MG TABLET DISPERSIBLE, DISSOLVE 1 TABLET ON THE TONGUE EVERY 8 HOURS FOR UP TO 2 DAYS AS NEEDED FOR NAUSEA, Disp: , Rfl:     omeprazole (PRILOSEC) 20 MG delayed-release capsule, Take 1 Capsule by mouth every day., Disp: 30 Capsule, Rfl: 0    No Known Allergies    Objective:     Vitals: /68 (BP Location: Left arm, Patient Position: Sitting, BP Cuff Size: Adult)   Pulse 89   Temp 37.2 °C (98.9 °F) (Temporal)   Ht 1.575 m (5' 2\")   Wt 89.8 kg (198 lb)   LMP 02/15/2023   SpO2 98%   BMI 36.21 kg/m²    General: Alert, pleasant, NAD  HEENT: Normocephalic.  Neck supple.   Respiratory: no distress, no audible wheezing, RR -WNL  Skin: Warm, dry, no rashes.  Extremities: No leg edema. No discoloration  Neurological: No tremors  Psych:  Affect/mood is normal, judgement is good, memory is intact, grooming is appropriate.    Assessment/Plan:      1. Encounter for initial prescription of contraceptive pills  At this time patient is interested in starting on oral contraceptives while waiting for approval for her Nexplanon.  - Norethin Ace-Eth Estrad-FE 1-20 MG-MCG(24) Tab; Take 1 Tablet by mouth every day.  Dispense: 28 Tablet; " Refill: 3    2. Encounter for counseling regarding contraception  Have discussed multiple options of contraception with the patient.  At this time she is interested in the Nexplanon as this would be better suited for her lifestyle.  Have discussed common side effects, ADRs of Nexplanon with patient.  Have provided handout regarding patient information.  Have completed the Nexplanon enrollment form.  Once this is approved by her insurance and product is delivered to the clinic I have informed her she will be contacted to schedule an appointment with Dr. Salas for placement.   -Patient understands    these instructions.    3. Obesity (BMI 35.0-39.9 without comorbidity)  - Patient identified as having weight management issue.  Appropriate orders and counseling given.      Return for follow up for Nexplanon insertion with Dr. Salas.        Laurence PORRAS.

## 2023-02-22 DIAGNOSIS — Z30.011 ENCOUNTER FOR INITIAL PRESCRIPTION OF CONTRACEPTIVE PILLS: ICD-10-CM

## 2023-02-23 RX ORDER — NORETHINDRONE ACETATE AND ETHINYL ESTRADIOL 1MG-20(21)
KIT ORAL
Qty: 84 TABLET | Refills: 1 | Status: SHIPPED | OUTPATIENT
Start: 2023-02-23 | End: 2023-03-17

## 2023-03-14 DIAGNOSIS — Z30.011 ENCOUNTER FOR INITIAL PRESCRIPTION OF CONTRACEPTIVE PILLS: ICD-10-CM

## 2023-03-17 RX ORDER — NORETHINDRONE ACETATE AND ETHINYL ESTRADIOL 1MG-20(21)
KIT ORAL
Qty: 84 TABLET | Refills: 1 | Status: SHIPPED | OUTPATIENT
Start: 2023-03-17

## 2023-04-14 DIAGNOSIS — Z30.09 ENCOUNTER FOR COUNSELING REGARDING CONTRACEPTION: ICD-10-CM

## 2023-04-14 DIAGNOSIS — Z30.017 ENCOUNTER FOR INITIAL PRESCRIPTION OF NEXPLANON: ICD-10-CM

## 2023-05-09 ENCOUNTER — OFFICE VISIT (OUTPATIENT)
Dept: MEDICAL GROUP | Facility: MEDICAL CENTER | Age: 23
End: 2023-05-09
Payer: COMMERCIAL

## 2023-05-09 VITALS
HEART RATE: 82 BPM | OXYGEN SATURATION: 98 % | SYSTOLIC BLOOD PRESSURE: 114 MMHG | HEIGHT: 62 IN | WEIGHT: 200.4 LBS | TEMPERATURE: 98.3 F | DIASTOLIC BLOOD PRESSURE: 66 MMHG | BODY MASS INDEX: 36.88 KG/M2

## 2023-05-09 DIAGNOSIS — N64.4 BREAST PAIN, LEFT: ICD-10-CM

## 2023-05-09 PROCEDURE — 99213 OFFICE O/P EST LOW 20 MIN: CPT | Performed by: NURSE PRACTITIONER

## 2023-05-09 ASSESSMENT — FIBROSIS 4 INDEX: FIB4 SCORE: 0.52

## 2023-05-09 NOTE — PROGRESS NOTES
"Chief Complaint   Patient presents with    Breast Pain     Pain in L breast since April 27th        Subjective:     HPI:     Tami Jimenez is a 23 y.o. female here to discuss the following:    Pain in Left breast since April 27th.   Not currently on menstrual cycle.   Was uncomfortable with her bra on.   Not able feel any lumps.   No discharge from nipples.   No redness.   No new exercise routine .  No family hx of Breast Ca.      ROS: : see above        Current Outpatient Medications:     BLISOVI FE 1/20 1-20 MG-MCG per tablet, TAKE 1 TABLET BY MOUTH EVERY DAY, Disp: 84 Tablet, Rfl: 1    ondansetron (ZOFRAN ODT) 4 MG TABLET DISPERSIBLE, DISSOLVE 1 TABLET ON THE TONGUE EVERY 8 HOURS FOR UP TO 2 DAYS AS NEEDED FOR NAUSEA, Disp: , Rfl:     omeprazole (PRILOSEC) 20 MG delayed-release capsule, Take 1 Capsule by mouth every day., Disp: 30 Capsule, Rfl: 0    No Known Allergies    Objective:     Vitals: /66 (BP Location: Right arm, Patient Position: Sitting, BP Cuff Size: Adult)   Pulse 82   Temp 36.8 °C (98.3 °F) (Temporal)   Ht 1.575 m (5' 2\")   Wt 90.9 kg (200 lb 6.4 oz)   SpO2 98%   BMI 36.65 kg/m²    General: Alert, pleasant, NAD  HEENT: Normocephalic.  Neck supple.   Respiratory: no distress, no audible wheezing, RR -WNL  Breast : Large pendulous breast, tenderness left upper and lower quadrant, very small possible cyst palpated left lower.  No redness, discoloration, nipple discharge.  skin: Warm, dry, no rashes.  Extremities: No leg edema. No discoloration  Neurological: No tremors  Psych:  Affect/mood is normal, judgement is good, memory is intact, grooming is appropriate.    Assessment/Plan:      1. Breast pain, left  Acute problem x2 weeks.  Physical exam with tenderness however no overt palpable mass in stated area of pain, just below area of reported pain palpated very small possible cyst that was tender.  At this time recommend watchful waiting for the next 4 weeks however if symptoms " change or progress may obtain ultrasound.  - US-BREAST LIMITED-LEFT; Future      Return if symptoms worsen or fail to improve.          Laurence PORRAS.

## 2023-05-09 NOTE — LETTER
May 9, 2023        Tami Jimenez  [address] [facility] [address]      Please excuse Ms. Tami Jimenez from work today May 9th, 2023 as she was seen in the clinic for an acute visit.     If you have any questions or concerns, please don't hesitate to call.        Sincerely,        CHIQUITA Morrow.    Electronically Signed

## 2023-06-09 ENCOUNTER — GYNECOLOGY VISIT (OUTPATIENT)
Dept: OBGYN | Facility: CLINIC | Age: 23
End: 2023-06-09
Payer: COMMERCIAL

## 2023-06-09 VITALS — SYSTOLIC BLOOD PRESSURE: 140 MMHG | WEIGHT: 196 LBS | BODY MASS INDEX: 35.85 KG/M2 | DIASTOLIC BLOOD PRESSURE: 89 MMHG

## 2023-06-09 DIAGNOSIS — Z30.017 NEXPLANON INSERTION: Primary | ICD-10-CM

## 2023-06-09 LAB
POCT INT CON NEG: NEGATIVE
POCT INT CON POS: POSITIVE
POCT URINE PREGNANCY TEST: NEGATIVE

## 2023-06-09 PROCEDURE — 11981 INSERTION DRUG DLVR IMPLANT: CPT | Performed by: OBSTETRICS & GYNECOLOGY

## 2023-06-09 PROCEDURE — 3077F SYST BP >= 140 MM HG: CPT | Performed by: OBSTETRICS & GYNECOLOGY

## 2023-06-09 PROCEDURE — 81025 URINE PREGNANCY TEST: CPT | Performed by: OBSTETRICS & GYNECOLOGY

## 2023-06-09 PROCEDURE — 3079F DIAST BP 80-89 MM HG: CPT | Performed by: OBSTETRICS & GYNECOLOGY

## 2023-06-09 ASSESSMENT — FIBROSIS 4 INDEX: FIB4 SCORE: 0.52

## 2023-06-09 NOTE — PROCEDURES
Procedure note; Nexplanon insertion;    Informed consent obtained, consent signed-discussed the risks of Nexplanon; pain, bleeding, infection, bruising, possible migration, possible pregnancy, difficulty with removing implant, possible scarring to arm    Urine hCG negative    Patient positioned supine with nondominant arm exposed.    Medial epicondyle of left arm palpated    Surgical ankit placed 8-10 cm medial to the medial epicondyle    Betadine prep the skin    Local anesthesia-1% lidocaine injected using 2 inch needle approximately 4cm underneath skin    Implant inserted at a 30 degree angle to the skin using insertion device    Confirmation of successful implant placement by palpating the implant just underneath the skin    Steri-Strip placed    Pressure bandage placed    Patient instructed to remove gauze in 24 hours    Patient instructed to remove Steri-Strip at 3-5 days    Patient tolerated the procedure well    Followup in 2 weeks for postop checkup

## 2023-07-07 ENCOUNTER — HOSPITAL ENCOUNTER (OUTPATIENT)
Dept: RADIOLOGY | Facility: MEDICAL CENTER | Age: 23
End: 2023-07-07
Attending: NURSE PRACTITIONER
Payer: COMMERCIAL

## 2023-07-07 DIAGNOSIS — N64.4 BREAST PAIN, LEFT: ICD-10-CM

## 2023-07-07 PROCEDURE — 76642 ULTRASOUND BREAST LIMITED: CPT | Mod: LT

## 2023-07-12 ENCOUNTER — OFFICE VISIT (OUTPATIENT)
Dept: URGENT CARE | Facility: PHYSICIAN GROUP | Age: 23
End: 2023-07-12
Payer: COMMERCIAL

## 2023-07-12 VITALS
WEIGHT: 191.8 LBS | RESPIRATION RATE: 16 BRPM | DIASTOLIC BLOOD PRESSURE: 64 MMHG | HEIGHT: 62 IN | BODY MASS INDEX: 35.3 KG/M2 | HEART RATE: 77 BPM | TEMPERATURE: 98.4 F | OXYGEN SATURATION: 99 % | SYSTOLIC BLOOD PRESSURE: 108 MMHG

## 2023-07-12 DIAGNOSIS — R11.14 BILIOUS VOMITING WITH NAUSEA: ICD-10-CM

## 2023-07-12 DIAGNOSIS — R10.84 GENERALIZED ABDOMINAL PAIN: ICD-10-CM

## 2023-07-12 LAB
APPEARANCE UR: CLEAR
BILIRUB UR STRIP-MCNC: NEGATIVE MG/DL
COLOR UR AUTO: YELLOW
GLUCOSE UR STRIP.AUTO-MCNC: NEGATIVE MG/DL
KETONES UR STRIP.AUTO-MCNC: NEGATIVE MG/DL
LEUKOCYTE ESTERASE UR QL STRIP.AUTO: NORMAL
NITRITE UR QL STRIP.AUTO: NEGATIVE
PH UR STRIP.AUTO: 7 [PH] (ref 5–8)
POCT INT CON NEG: NEGATIVE
POCT INT CON POS: POSITIVE
POCT URINE PREGNANCY TEST: NEGATIVE
PROT UR QL STRIP: NEGATIVE MG/DL
RBC UR QL AUTO: NEGATIVE
SP GR UR STRIP.AUTO: 1.02
UROBILINOGEN UR STRIP-MCNC: 0.2 MG/DL

## 2023-07-12 PROCEDURE — 81002 URINALYSIS NONAUTO W/O SCOPE: CPT | Performed by: NURSE PRACTITIONER

## 2023-07-12 PROCEDURE — 81025 URINE PREGNANCY TEST: CPT | Performed by: NURSE PRACTITIONER

## 2023-07-12 PROCEDURE — 3074F SYST BP LT 130 MM HG: CPT | Performed by: NURSE PRACTITIONER

## 2023-07-12 PROCEDURE — 99214 OFFICE O/P EST MOD 30 MIN: CPT | Performed by: NURSE PRACTITIONER

## 2023-07-12 PROCEDURE — 3078F DIAST BP <80 MM HG: CPT | Performed by: NURSE PRACTITIONER

## 2023-07-12 RX ORDER — ONDANSETRON 4 MG/1
4 TABLET, ORALLY DISINTEGRATING ORAL EVERY 6 HOURS PRN
Qty: 10 TABLET | Refills: 0 | Status: SHIPPED | OUTPATIENT
Start: 2023-07-12 | End: 2023-07-17

## 2023-07-12 RX ORDER — NORETHINDRONE ACETATE AND ETHINYL ESTRADIOL 1MG-20(21)
1 KIT ORAL
COMMUNITY
Start: 2023-03-17

## 2023-07-12 ASSESSMENT — FIBROSIS 4 INDEX: FIB4 SCORE: 0.52

## 2023-07-12 ASSESSMENT — ENCOUNTER SYMPTOMS
FATIGUE: 1
ABDOMINAL PAIN: 1
VOMITING: 1

## 2023-07-12 NOTE — LETTER
July 12, 2023    To Whom It May Concern:         This is confirmation that Tami Jimenez attended her scheduled appointment with RODRIGO [place] on 7/12/23.  Please excuse her absence through 7/14/2023 due to an acute illness.  She may return sooner if better.         If you have any questions please do not hesitate to call me at the phone number listed below.    Sincerely,          CHIQUITA [place].  [phone]

## 2023-07-13 ENCOUNTER — HOSPITAL ENCOUNTER (OUTPATIENT)
Dept: RADIOLOGY | Facility: MEDICAL CENTER | Age: 23
End: 2023-07-13
Attending: NURSE PRACTITIONER
Payer: COMMERCIAL

## 2023-07-13 DIAGNOSIS — R10.84 GENERALIZED ABDOMINAL PAIN: ICD-10-CM

## 2023-07-13 DIAGNOSIS — R11.14 BILIOUS VOMITING WITH NAUSEA: ICD-10-CM

## 2023-07-13 PROCEDURE — 700117 HCHG RX CONTRAST REV CODE 255: Performed by: NURSE PRACTITIONER

## 2023-07-13 PROCEDURE — 74177 CT ABD & PELVIS W/CONTRAST: CPT

## 2023-07-13 RX ADMIN — IOHEXOL 100 ML: 350 INJECTION, SOLUTION INTRAVENOUS at 08:19

## 2023-07-13 NOTE — PROGRESS NOTES
You could be nice subjective:     Tami Jimenez is a 23 y.o. female who presents for Vomiting (X3 days, can't keep down food or water ), Abdominal Pain (X3 days, on and off pain  ), Nasal Congestion (X2 days ), and Fatigue (X3 days )      Vomiting  Associated symptoms include abdominal pain, fatigue and vomiting.   Abdominal Pain  Associated symptoms include vomiting.   Fatigue  Associated symptoms include abdominal pain, fatigue and vomiting.     Pt presents for evaluation of a new problem.  Tami is a very pleasant 23-year-old female who presents to urgent care today with complaints of intermittent abdominal pain and nausea/vomiting x3 days.  Her pain is rated as a 6/10.  She complains of umbilical pain.  She denies any fever, chills or diarrhea.  No previous abdominal surgeries.  She denies any dysuria, urgency or urinary frequency.    Review of Systems   Constitutional:  Positive for fatigue.   Gastrointestinal:  Positive for abdominal pain and vomiting.       PMH:   Past Medical History:   Diagnosis Date    Migraines     no aura     ALLERGIES: No Known Allergies  SURGHX:   Past Surgical History:   Procedure Laterality Date    EYE LACERATION REPAIR  1/25/2009    Performed by LARON MALCOLM at SURGERY [facility]     SOCHX:   Social History     Socioeconomic History    Marital status: Single   Tobacco Use    Smoking status: Never    Smokeless tobacco: Never   Vaping Use    Vaping Use: Never used   Substance and Sexual Activity    Alcohol use: Not Currently     Comment: occ    Drug use: No    Sexual activity: Not Currently     FH:   Family History   Problem Relation Age of Onset    Heart Disease Maternal Grandfather     Hypertension Maternal Grandfather     No Known Problems Mother     Heart Disease Paternal Grandmother         CABG    Heart Attack Paternal Grandfather     Alcohol abuse Paternal Grandfather     Diabetes Other          Objective:   /64 (BP Location: Right arm, Patient  "Position: Sitting, BP Cuff Size: Large adult)   Pulse 77   Temp 36.9 °C (98.4 °F) (Temporal)   Resp 16   Ht 1.575 m (5' 2\")   Wt 87 kg (191 lb 12.8 oz)   SpO2 99%   BMI 35.08 kg/m²     Physical Exam  Vitals and nursing note reviewed.   Constitutional:       General: She is not in acute distress.     Appearance: Normal appearance. She is not ill-appearing.   HENT:      Head: Normocephalic and atraumatic.      Right Ear: External ear normal.      Left Ear: External ear normal.      Nose: No congestion or rhinorrhea.      Mouth/Throat:      Mouth: Mucous membranes are moist.   Eyes:      Extraocular Movements: Extraocular movements intact.      Pupils: Pupils are equal, round, and reactive to light.   Cardiovascular:      Rate and Rhythm: Normal rate and regular rhythm.      Pulses: Normal pulses.      Heart sounds: Normal heart sounds.   Pulmonary:      Effort: Pulmonary effort is normal.      Breath sounds: Normal breath sounds.   Abdominal:      General: Abdomen is flat. Bowel sounds are normal.      Palpations: Abdomen is soft.      Tenderness: There is abdominal tenderness in the right lower quadrant and periumbilical area. There is rebound. There is no right CVA tenderness or left CVA tenderness.       Musculoskeletal:         General: Normal range of motion.      Cervical back: Normal range of motion and neck supple.   Skin:     General: Skin is warm and dry.      Capillary Refill: Capillary refill takes less than 2 seconds.   Neurological:      General: No focal deficit present.      Mental Status: She is alert and oriented to person, place, and time. Mental status is at baseline.   Psychiatric:         Mood and Affect: Mood normal.         Behavior: Behavior normal.         Thought Content: Thought content normal.         Judgment: Judgment normal.       Results for orders placed or performed in visit on 07/12/23   POCT Urinalysis   Result Value Ref Range    POC Color Yellow Negative    POC Appearance " Clear Negative    POC Glucose Negative Negative mg/dL    POC Bilirubin Negative Negative mg/dL    POC Ketones Negative Negative mg/dL    POC Specific Gravity 1.020 <1.005 - >1.030    POC Blood Negative Negative    POC Urine PH 7.0 5.0 - 8.0    POC Protein Negative Negative mg/dL    POC Urobiligen 0.2 Negative (0.2) mg/dL    POC Nitrites Negative Negative    POC Leukocyte Esterase Trace Negative   POCT Pregnancy   Result Value Ref Range    POC Urine Pregnancy Test Negative     Internal Control Positive Positive     Internal Control Negative Negative        Assessment/Plan:   Assessment    1. Generalized abdominal pain  POCT Urinalysis    POCT Pregnancy    CT-ABDOMEN-PELVIS WITH    CANCELED: CT-ABDOMEN-PELVIS WITH      2. Bilious vomiting with nausea  POCT Urinalysis    POCT Pregnancy    CT-ABDOMEN-PELVIS WITH    ondansetron (ZOFRAN ODT) 4 MG TABLET DISPERSIBLE    CANCELED: CT-ABDOMEN-PELVIS WITH      Stat ultrasound ordered for evaluation of abdominal pain and persistent nausea/vomiting.  Zofran ordered for relief of nausea.  Patient instructed to replace fluids with Gatorade/Pedialyte strict ER precautions given for worsening symptoms.  She is in agreement with plan of care.  AVS handout given and reviewed with patient. Pt educated on red flags and when to seek treatment back in ER or UC.

## 2023-11-21 ENCOUNTER — TELEMEDICINE (OUTPATIENT)
Dept: MEDICAL GROUP | Facility: MEDICAL CENTER | Age: 23
End: 2023-11-21
Payer: COMMERCIAL

## 2023-11-21 VITALS — WEIGHT: 190 LBS | HEIGHT: 62 IN | RESPIRATION RATE: 16 BRPM | BODY MASS INDEX: 34.96 KG/M2

## 2023-11-21 DIAGNOSIS — N92.6 IRREGULAR MENSES: ICD-10-CM

## 2023-11-21 DIAGNOSIS — Z97.5 NEXPLANON IN PLACE: ICD-10-CM

## 2023-11-21 PROCEDURE — 99213 OFFICE O/P EST LOW 20 MIN: CPT | Mod: 95 | Performed by: NURSE PRACTITIONER

## 2023-11-21 ASSESSMENT — FIBROSIS 4 INDEX: FIB4 SCORE: 0.52

## 2023-11-22 NOTE — PROGRESS NOTES
Virtual Visit: Established Patient   This visit was conducted via Zoom using secure and encrypted videoconferencing technology.   The patient was in their home in the [facility].    The patient's identity was confirmed and verbal consent was obtained for this virtual visit.    Subjective:   CC:   Chief Complaint   Patient presents with    Follow-Up     Birth control concerns     Tami Jimenez is a 23 y.o. female presenting for evaluation and management of:    Had Nexplanon placed July 2023.   Typically will have a menstrual bleed that will last about 3 to 4 days however this most recent 1 stopped for a day and then started again and continued for another 2 weeks.  This caused some concern for her she also had severe cramping and noticed a few blood clots that were the size of a quarter.  This is not typical for her.  No previous history of pelvic ultrasound.    Current medicines (including changes today)  Current Outpatient Medications   Medication Sig Dispense Refill    norethindrone-ethinyl estradiol (BLISOVI FE 1/20) 1-20 MG-MCG per tablet Take 1 Tablet by mouth every day. (Patient not taking: Reported on 7/12/2023)      BLISOVI FE 1/20 1-20 MG-MCG per tablet TAKE 1 TABLET BY MOUTH EVERY DAY (Patient not taking: Reported on 7/12/2023) 84 Tablet 1    ondansetron (ZOFRAN ODT) 4 MG TABLET DISPERSIBLE DISSOLVE 1 TABLET ON THE TONGUE EVERY 8 HOURS FOR UP TO 2 DAYS AS NEEDED FOR NAUSEA      omeprazole (PRILOSEC) 20 MG delayed-release capsule Take 1 Capsule by mouth every day. 30 Capsule 0     No current facility-administered medications for this visit.       Patient Active Problem List    Diagnosis Date Noted    Nexplanon in place 11/21/2023    Fatigue 02/07/2023    History of Raphael-Barr virus infection 02/07/2023    Migraine without aura and without status migrainosus, not intractable 11/21/2019    Tension headache 11/21/2019    Obesity (BMI 35.0-39.9 without comorbidity) (Formerly [facility]) 11/18/2017         "Objective:   Resp 16   Ht 1.575 m (5' 2\")   Wt 86.2 kg (190 lb) Comment: pt stated  BMI 34.75 kg/m²     Physical Exam:  Constitutional: Alert, no distress, well-groomed.  Skin: No rashes in visible areas.  Eye: Round. Conjunctiva clear, lids normal. No icterus.   ENMT: Lips pink without lesions, good dentition, moist mucous membranes. Phonation normal.  Neck: No masses, no thyromegaly. Moves freely without pain.  Respiratory: Unlabored respiratory effort, no cough or audible wheeze  Psych: Alert and oriented x3, normal affect and mood.     Assessment and Plan:   The following treatment plan was discussed:     1. Irregular menses  New problem times one-time episode this last month.  Recommend monitoring if symptoms persist with ongoing heavy menstrual cycles we have discussed ordering pelvic ultrasound for further evaluation.    2. Nexplanon in place    Follow-up: No follow-ups on file.         "

## 2024-09-13 ENCOUNTER — APPOINTMENT (OUTPATIENT)
Dept: OBGYN | Facility: CLINIC | Age: 24
End: 2024-09-13
Payer: MEDICAID

## 2024-09-26 ENCOUNTER — GYNECOLOGY VISIT (OUTPATIENT)
Dept: OBGYN | Facility: CLINIC | Age: 24
End: 2024-09-26
Payer: MEDICAID

## 2024-09-26 VITALS — WEIGHT: 227 LBS | BODY MASS INDEX: 41.52 KG/M2 | SYSTOLIC BLOOD PRESSURE: 116 MMHG | DIASTOLIC BLOOD PRESSURE: 64 MMHG

## 2024-09-26 DIAGNOSIS — Z30.46 ENCOUNTER FOR NEXPLANON REMOVAL: ICD-10-CM

## 2024-09-26 ASSESSMENT — FIBROSIS 4 INDEX: FIB4 SCORE: 0.54

## 2024-09-26 NOTE — PROGRESS NOTES
Pt here for Nexplanon removal.    Pt states no complaints   Good# [phone]  LMP: Unknown   Pharmacy confirmed

## 2024-09-26 NOTE — PROCEDURES
Procedure    After consent obtained, Nexplanon was identified in patient left upper arm through palpation. Site was marked and allergies verified. Betadine used x 3 to clean marked area. 3 ml of 2% lidocaine was injected at the marked location which was the distal end of the Nexplanon. Two minutes was observed to allow appropriate anesthesia. Site was tested with curved hemostat and found to have adequate anesthesia. Site was again cleaned with betadine x 1 and blotted dry with sterile gauze. Using 10 blade, incision was made perpendicular to distal end of the Nexplanon. With gentle manipulation, the distal end of the Nexplanon was revealed. Curved hemostats were used to grasp this and Nexplanon was removed intact from patient arm. Verified intact by patient and discarded. Steristrip was placed at the site and pressure dressing applied. EBL 3ml. She tolerated procedure well.

## 2024-09-26 NOTE — PROGRESS NOTES
Tami Jimenez is a 24 y.o. y.o. female who presents for Nexplanon Removal        HPI Comments: Pt presents for Nexplanon Removal.  No LMP recorded (lmp unknown). Patient desires removal of Nexplanon because she is having irregular bleeding. Also desires to be hormone free.     Review of Systems   Pertinent positives documented in HPI and all other systems reviewed & are negative      Past Medical History:   Diagnosis Date    Migraines     no aura       Medications:   Current Outpatient Medications Ordered in Epic   Medication Sig Dispense Refill    ondansetron (ZOFRAN ODT) 4 MG TABLET DISPERSIBLE DISSOLVE 1 TABLET ON THE TONGUE EVERY 8 HOURS FOR UP TO 2 DAYS AS NEEDED FOR NAUSEA      omeprazole (PRILOSEC) 20 MG delayed-release capsule Take 1 Capsule by mouth every day. 30 Capsule 0     No current Epic-ordered facility-administered medications on file.          Objective:   Vital measurements:  /64   Wt 227 lb   Body mass index is 41.52 kg/m². (Goal BM I>18 <25)    Physical Exam   Nursing note and vitals reviewed.  Constitutional: She is oriented to person, place, and time. She appears well-developed and well-nourished. No distress.     Musculoskeletal: Normal range of motion. She exhibits no edema and no tenderness.     Skin: Skin is warm and dry. No rash noted. She is not diaphoretic. No erythema. No pallor.     Psychiatric: She has a normal mood and affect. Her behavior is normal. Judgment and thought content normal.          Assessment:   1. Encounter for Nexplanon removal    - Consent for all Surgical, Special Diagnostic or Therapeutic Procedures      Plan:   1. Discussed care of site with patient. Patient aware that some bruising may exist over the next 3-5 days. Keep site clean and dry.

## 2025-06-16 ENCOUNTER — GYNECOLOGY VISIT (OUTPATIENT)
Dept: OBGYN | Facility: CLINIC | Age: 25
End: 2025-06-16
Payer: MEDICAID

## 2025-06-16 ENCOUNTER — HOSPITAL ENCOUNTER (OUTPATIENT)
Facility: MEDICAL CENTER | Age: 25
End: 2025-06-16
Attending: STUDENT IN AN ORGANIZED HEALTH CARE EDUCATION/TRAINING PROGRAM
Payer: MEDICAID

## 2025-06-16 VITALS
HEIGHT: 62 IN | WEIGHT: 230 LBS | SYSTOLIC BLOOD PRESSURE: 114 MMHG | DIASTOLIC BLOOD PRESSURE: 66 MMHG | BODY MASS INDEX: 42.33 KG/M2

## 2025-06-16 DIAGNOSIS — N92.0 MENORRHAGIA WITH REGULAR CYCLE: ICD-10-CM

## 2025-06-16 DIAGNOSIS — Z12.4 SCREENING FOR MALIGNANT NEOPLASM OF CERVIX PERFORMED: ICD-10-CM

## 2025-06-16 DIAGNOSIS — Z83.3 FAMILY HISTORY OF DIABETES MELLITUS: ICD-10-CM

## 2025-06-16 DIAGNOSIS — N91.1 SECONDARY AMENORRHEA: ICD-10-CM

## 2025-06-16 DIAGNOSIS — N94.6 DYSMENORRHEA: ICD-10-CM

## 2025-06-16 DIAGNOSIS — Z82.49 FAMILY HISTORY OF HEART DISEASE: ICD-10-CM

## 2025-06-16 DIAGNOSIS — E66.813 OBESITY, CLASS III, BMI 40-49.9 (MORBID OBESITY): ICD-10-CM

## 2025-06-16 DIAGNOSIS — Z11.3 SCREEN FOR STD (SEXUALLY TRANSMITTED DISEASE): ICD-10-CM

## 2025-06-16 DIAGNOSIS — Z01.419 WOMEN'S ANNUAL ROUTINE GYNECOLOGICAL EXAMINATION: Primary | ICD-10-CM

## 2025-06-16 PROCEDURE — G0101 CA SCREEN;PELVIC/BREAST EXAM: HCPCS | Performed by: STUDENT IN AN ORGANIZED HEALTH CARE EDUCATION/TRAINING PROGRAM

## 2025-06-16 PROCEDURE — 88142 CYTOPATH C/V THIN LAYER: CPT

## 2025-06-16 PROCEDURE — 3074F SYST BP LT 130 MM HG: CPT | Performed by: STUDENT IN AN ORGANIZED HEALTH CARE EDUCATION/TRAINING PROGRAM

## 2025-06-16 PROCEDURE — 3078F DIAST BP <80 MM HG: CPT | Performed by: STUDENT IN AN ORGANIZED HEALTH CARE EDUCATION/TRAINING PROGRAM

## 2025-06-16 PROCEDURE — 99214 OFFICE O/P EST MOD 30 MIN: CPT | Mod: 25 | Performed by: STUDENT IN AN ORGANIZED HEALTH CARE EDUCATION/TRAINING PROGRAM

## 2025-06-16 PROCEDURE — 87491 CHLMYD TRACH DNA AMP PROBE: CPT

## 2025-06-16 PROCEDURE — 87591 N.GONORRHOEAE DNA AMP PROB: CPT

## 2025-06-16 NOTE — PROGRESS NOTES
ANNUAL GYNECOLOGY VISIT    Chief Complaint  Gynecologic Exam      Subjective     Subjective  Tami Rema Jimenez is a 25 y.o. female who presents today for Annual Exam.  She has never had a Pap smear is not interested in one today. She had a Nexplanon removed in September 2024 (paced July 2023) due to irregular bleeding and mood swings. Also previously took OCPs, but is currently using condoms for contraception.  She did have to take Plan B in April in the middle of May when she was sexually active without protection.  Since April she has noticed absence of her menstrual cycle.  Taken multiple pregnancy tests and they have been negative.  Her most recent pregnancy test was 3 weeks ago.  She is also noticed a weight gain of 80 pounds over the last year.  She is currently trying to use weight and has recent they changed her diet including stopped drinking sodas and eating candy.  She is now focusing on eating lean meats, vegetables, and fruits.  She is increased her frequency of walks, and she is frequently working outside [employer].  Denies any breast discharge, hot flashes, night sweats, new acne, hirsutism, or systemic illness.    Preventive Care   Immunization History   Administered Date(s) Administered    Dtap Vaccine 1998, 1998, 1998, 05/19/1999, 05/17/2002    HPV Quadrivalent Vaccine (GARDASIL) - HISTORICAL DATA 08/17/2011, 03/08/2012, 07/13/2012    Hepatitis A Vaccine, Ped/Adol 03/07/2007, 12/04/2007    Hepatitis B Vaccine Adolescent/Pediatric 1998, 1998, 1998    Hib Vaccine (Prp-d) - HISTORICAL DATA 1998, 1998, 1998, 05/19/1999    IPV 1998, 1998, 05/13/2002    MMR Vaccine 05/19/1999, 05/17/2002    MODERNA SARS-COV-2 VACCINE (12+) 05/27/2021, 06/24/2021    Meningococcal Conjugate Vaccine MCV4 (MENVEO) 08/17/2011    Meningococcal Conjugate Vaccine MCV4 (Menactra) 04/25/2014    OPV TRIVALENT - HISTORICAL DATA (GIVEN PRIOR TO MAY 2016)  1999    Tdap Vaccine 2011, 2018    Varicella Vaccine Live 1999, 2007     Gynecology History and ROS  Current Sexual Activity: Yes  History of sexually transmitted diseases?  no  Abnormal discharge? No  Current Contraception:  condoms    Menstrual History  Patient's last menstrual period was 2025 (exact date).  Periods are regular q 25-28 days, lasting 5-6 days.   Clots or heavy flow: Yes - changing pads every 30 min to 1 hr, just lasts one day  Dysmenorrhea: Yes - missing school/work due to pain; takes tylenol/ibuprofen  Intermenstrual bleeding/spotting: No  Significant pain with periods: Yes   Significant Pelvic Pain: No    Pap History  Last pap smear:  never  History of moderate or severe dysplasia: No    Cancer Risk Assessement:  Family history of:   - Breast cancer: no   - Ovarian cancer: no   - Uterine cancer: no   - Colon cancer: no    Obstetric History  OB History    Para Term  AB Living   0 0 0 0 0 0   SAB IAB Ectopic Molar Multiple Live Births   0 0 0 0 0 0       Past Medical History  Past Medical History[1]    Past Surgical History  Past Surgical History[2]    Social History  Social History     Socioeconomic History    Marital status: Single     Spouse name: Not on file    Number of children: Not on file    Years of education: Not on file    Highest education level: Not on file   Occupational History    Not on file   Tobacco Use    Smoking status: Never    Smokeless tobacco: Never   Vaping Use    Vaping status: Never Used   Substance and Sexual Activity    Alcohol use: Not Currently     Comment: occ    Drug use: No    Sexual activity: Yes     Partners: Male     Birth control/protection: None   Other Topics Concern    Not on file   Social History Narrative    Not on file     Social Drivers of Health     Financial Resource Strain: Not on file   Food Insecurity: Not on file   Transportation Needs: Not on file   Physical Activity: Not on file   Stress: Not on  "file   Social Connections: Not on file   Intimate Partner Violence: Not on file   Housing Stability: Not on file       Family History  Family History   Problem Relation Age of Onset    Heart Disease Maternal Grandfather     Hypertension Maternal Grandfather     No Known Problems Mother     Heart Disease Paternal Grandmother         CABG    Heart Attack Paternal Grandfather     Alcohol abuse Paternal Grandfather     Diabetes Other        Home Medications  Medications Ordered Prior to Encounter[3]    Allergies/Reactions  Allergies[4]    ROS  Review of Systems   All other systems reviewed and are negative.      Objective     Physical Examination:  Vital Signs:   Vitals:    06/16/25 1014   BP: 114/66   BP Location: Left arm   Patient Position: Sitting   BP Cuff Size: Large adult   Weight: 230 lb   Height: 5' 2\"     Body mass index is 42.07 kg/m².    Physical Exam  Exam conducted with a chaperone present.   Constitutional:       General: She is not in acute distress.     Appearance: Normal appearance. She is obese.   Eyes:      Conjunctiva/sclera: Conjunctivae normal.   Pulmonary:      Effort: Pulmonary effort is normal.   Chest:      Chest wall: No mass, lacerations, deformity or tenderness.   Breasts:     Breasts are symmetrical.      Right: No swelling, bleeding, mass or skin change.      Left: No swelling, bleeding, mass or skin change.   Abdominal:      General: There is no distension.      Palpations: Abdomen is soft.      Tenderness: There is no abdominal tenderness.   Genitourinary:     General: Normal vulva.      Exam position: Lithotomy position.      Labia:         Right: No rash, tenderness or lesion.         Left: No rash, tenderness or lesion.       Urethra: No prolapse.      Vagina: Normal.      Cervix: Normal.      Uterus: Normal.       Adnexa: Right adnexa normal and left adnexa normal.   Lymphadenopathy:      Upper Body:      Right upper body: No supraclavicular or axillary adenopathy.      Left upper " body: No supraclavicular or axillary adenopathy.   Skin:     General: Skin is warm.   Neurological:      General: No focal deficit present.      Mental Status: She is alert and oriented to person, place, and time.   Psychiatric:         Mood and Affect: Mood normal.         Behavior: Behavior normal.       Assessment   Tami Jimenez is a 25 y.o. female who presents today for Annual Gyn Exam.     #Health Maintenance   - Pap: Collected  - STI Screening: GC/CT collected today  - Mammogram: not indicated  - Colonoscopy: not indicated  - Dexa: Not indicated  - Immunizations: Recommended flu in season. S/p Gardasil.   - Tobacco: nonsmoker  - Diabetes screening: ordered  - Cholesterol screening: ordered  - Counseling: breast self exam, condoms for STIs,   - Diet: Advised lean meats, fruits, vegetables, whole grains  - Sun Exposure: Advised sun protection and sunscreen use.  - Injury Prevention: Discussed regular use of seat belts.  - Contraception: Condoms  - PCP: Struggling to find someone to see.     #Secondary Amenorrhea  #Class III Obesity  - Defined as absence of menses for greater or equal to 3 months in those with regular cycles or 6 months in those with irregular cycles.  - Discussed causes of secondary amenorrhea including pregnancy, anovulation due to hypothyroidism, PCOS, hyperprolactinemia, or medications. Also discussed estrogen deficiency from perimenopausal state, premature ovarian failure, or HPO insufficiency. Also considered other factors such as increased stress, extreme athletic training, eating disorders, or physical factors such as reproductive tract obstruction (stenosis, asherman syndrome).  - My suspicion is that her recent usage of Plan B in April and in May is the most likely cause of her amenorrhea. She's also noted an 80lb weight gain over the past year which can also contributed to irregular menstrual cycles.  Recommended continued efforts at weight loss.  Will obtain basic labs  today and expectantly manage. If her menses does not return in 3 months, she is to follow-up in clinic for further workup    #Dysmenorrhea  #Menorrhagia  - She reports 1 day of heavy bleeding which saturates a pad in 30 to 60 minutes as well as dysmenorrhea that causes her to miss school/work  - TVUS ordered to evaluate for structural abnormalities  - Patient declines hormonal management today.  Recommended initial management with scheduled NSAIDs during her period    Return: 3 months for lab and TVUS review    Abby Rivera M.D.         [1]   Past Medical History:  Diagnosis Date    Migraines     no aura   [2]   Past Surgical History:  Procedure Laterality Date    EYE LACERATION REPAIR  1/25/2009    Performed by LARON MALCOLM at SURGERY [facility]   [3]   Current Outpatient Medications on File Prior to Visit   Medication Sig Dispense Refill    ondansetron (ZOFRAN ODT) 4 MG TABLET DISPERSIBLE DISSOLVE 1 TABLET ON THE TONGUE EVERY 8 HOURS FOR UP TO 2 DAYS AS NEEDED FOR NAUSEA      omeprazole (PRILOSEC) 20 MG delayed-release capsule Take 1 Capsule by mouth every day. 30 Capsule 0     No current facility-administered medications on file prior to visit.   [4] No Known Allergies

## 2025-06-16 NOTE — PROGRESS NOTES
Patient here for annual exam  Last pap done/result: never   LMP: 4/14/2025  BCM: none  Last mammogram, if applicable: n/a  Phone number: [phone]  Pharmacy verified    No period since April   Nexplanon removed 9/2024  Neg pregnancy tests   No PMS symptoms

## 2025-06-17 LAB
C TRACH DNA GENITAL QL NAA+PROBE: NEGATIVE
N GONORRHOEA DNA GENITAL QL NAA+PROBE: NEGATIVE
SPECIMEN SOURCE: NORMAL

## 2025-06-19 ENCOUNTER — RESULTS FOLLOW-UP (OUTPATIENT)
Dept: OBGYN | Facility: CLINIC | Age: 25
End: 2025-06-19
Payer: MEDICAID

## 2025-06-23 LAB — THINPREP PAP, CYTOLOGY NL11781: NORMAL

## 2025-07-18 ENCOUNTER — APPOINTMENT (OUTPATIENT)
Dept: RADIOLOGY | Facility: MEDICAL CENTER | Age: 25
End: 2025-07-18
Attending: STUDENT IN AN ORGANIZED HEALTH CARE EDUCATION/TRAINING PROGRAM
Payer: MEDICAID